# Patient Record
Sex: MALE | Race: WHITE | NOT HISPANIC OR LATINO | Employment: FULL TIME | URBAN - METROPOLITAN AREA
[De-identification: names, ages, dates, MRNs, and addresses within clinical notes are randomized per-mention and may not be internally consistent; named-entity substitution may affect disease eponyms.]

---

## 2017-02-22 ENCOUNTER — PATIENT MESSAGE (OUTPATIENT)
Dept: INTERNAL MEDICINE | Facility: CLINIC | Age: 42
End: 2017-02-22

## 2017-02-22 DIAGNOSIS — M54.2 NECK PAIN: Primary | ICD-10-CM

## 2017-02-24 ENCOUNTER — PATIENT MESSAGE (OUTPATIENT)
Dept: INTERNAL MEDICINE | Facility: CLINIC | Age: 42
End: 2017-02-24

## 2017-04-18 RX ORDER — TIOCONAZOLE 6.5 %
OINTMENT WITH PREFILLED APPLICATOR VAGINAL
Qty: 30 TABLET | Refills: 0 | Status: SHIPPED | OUTPATIENT
Start: 2017-04-18 | End: 2017-06-09 | Stop reason: SDUPTHER

## 2017-05-26 RX ORDER — OMEPRAZOLE 40 MG/1
CAPSULE, DELAYED RELEASE ORAL
Qty: 30 CAPSULE | Refills: 0 | Status: SHIPPED | OUTPATIENT
Start: 2017-05-26 | End: 2017-06-09 | Stop reason: SDUPTHER

## 2017-06-08 ENCOUNTER — PATIENT MESSAGE (OUTPATIENT)
Dept: INTERNAL MEDICINE | Facility: CLINIC | Age: 42
End: 2017-06-08

## 2017-06-11 RX ORDER — OMEPRAZOLE 40 MG/1
CAPSULE, DELAYED RELEASE ORAL
Qty: 30 CAPSULE | Refills: 0 | Status: SHIPPED | OUTPATIENT
Start: 2017-06-11 | End: 2017-07-29 | Stop reason: SDUPTHER

## 2017-07-31 RX ORDER — OMEPRAZOLE 40 MG/1
CAPSULE, DELAYED RELEASE ORAL
Qty: 30 CAPSULE | Refills: 4 | Status: SHIPPED | OUTPATIENT
Start: 2017-07-31 | End: 2018-08-13 | Stop reason: SDUPTHER

## 2017-10-27 ENCOUNTER — CLINICAL SUPPORT (OUTPATIENT)
Dept: FAMILY MEDICINE | Facility: CLINIC | Age: 42
End: 2017-10-27
Payer: COMMERCIAL

## 2017-10-27 DIAGNOSIS — Z23 NEED FOR PROPHYLACTIC VACCINATION AND INOCULATION AGAINST INFLUENZA: Primary | ICD-10-CM

## 2017-10-27 PROCEDURE — 90686 IIV4 VACC NO PRSV 0.5 ML IM: CPT | Mod: S$GLB,,, | Performed by: INTERNAL MEDICINE

## 2017-10-27 PROCEDURE — 99499 UNLISTED E&M SERVICE: CPT | Mod: S$GLB,,, | Performed by: INTERNAL MEDICINE

## 2017-10-27 PROCEDURE — 90471 IMMUNIZATION ADMIN: CPT | Mod: S$GLB,,, | Performed by: INTERNAL MEDICINE

## 2017-10-27 NOTE — PROGRESS NOTES
Patient tolerated injection well.  Instructed to wait 15 minutes after injection for monitoring. Verbalized understanding. VIS given.

## 2018-01-25 ENCOUNTER — HOSPITAL ENCOUNTER (INPATIENT)
Facility: HOSPITAL | Age: 43
LOS: 2 days | Discharge: HOME | DRG: 352 | End: 2018-01-28
Attending: EMERGENCY MEDICINE | Admitting: SURGERY
Payer: COMMERCIAL

## 2018-01-25 DIAGNOSIS — K40.30 INCARCERATED RIGHT INGUINAL HERNIA: Primary | ICD-10-CM

## 2018-01-25 PROCEDURE — 96374 THER/PROPH/DIAG INJ IV PUSH: CPT

## 2018-01-25 PROCEDURE — 96375 TX/PRO/DX INJ NEW DRUG ADDON: CPT

## 2018-01-25 PROCEDURE — 99285 EMERGENCY DEPT VISIT HI MDM: CPT | Mod: 25

## 2018-01-25 PROCEDURE — 96376 TX/PRO/DX INJ SAME DRUG ADON: CPT

## 2018-01-26 ENCOUNTER — SURGERY (OUTPATIENT)
Age: 43
End: 2018-01-26

## 2018-01-26 ENCOUNTER — ANESTHESIA EVENT (OUTPATIENT)
Dept: SURGERY | Facility: HOSPITAL | Age: 43
DRG: 352 | End: 2018-01-26
Payer: COMMERCIAL

## 2018-01-26 ENCOUNTER — ANESTHESIA (OUTPATIENT)
Dept: SURGERY | Facility: HOSPITAL | Age: 43
DRG: 352 | End: 2018-01-26
Payer: COMMERCIAL

## 2018-01-26 PROBLEM — K40.30 INCARCERATED RIGHT INGUINAL HERNIA: Status: ACTIVE | Noted: 2018-01-26

## 2018-01-26 LAB
ALBUMIN SERPL BCP-MCNC: 4.3 G/DL
ALP SERPL-CCNC: 78 U/L
ALT SERPL W/O P-5'-P-CCNC: 16 U/L
ANION GAP SERPL CALC-SCNC: 11 MMOL/L
AST SERPL-CCNC: 18 U/L
BASOPHILS # BLD AUTO: 0.01 K/UL
BASOPHILS NFR BLD: 0.1 %
BILIRUB SERPL-MCNC: 0.3 MG/DL
BUN SERPL-MCNC: 14 MG/DL
CALCIUM SERPL-MCNC: 10 MG/DL
CHLORIDE SERPL-SCNC: 102 MMOL/L
CO2 SERPL-SCNC: 29 MMOL/L
CREAT SERPL-MCNC: 0.8 MG/DL
DIFFERENTIAL METHOD: ABNORMAL
EOSINOPHIL # BLD AUTO: 0.4 K/UL
EOSINOPHIL NFR BLD: 2.9 %
ERYTHROCYTE [DISTWIDTH] IN BLOOD BY AUTOMATED COUNT: 14.5 %
EST. GFR  (AFRICAN AMERICAN): >60 ML/MIN/1.73 M^2
EST. GFR  (NON AFRICAN AMERICAN): >60 ML/MIN/1.73 M^2
GLUCOSE SERPL-MCNC: 108 MG/DL
HCT VFR BLD AUTO: 47.9 %
HGB BLD-MCNC: 16.2 G/DL
LYMPHOCYTES # BLD AUTO: 2.2 K/UL
LYMPHOCYTES NFR BLD: 16.2 %
MCH RBC QN AUTO: 33.5 PG
MCHC RBC AUTO-ENTMCNC: 33.8 G/DL
MCV RBC AUTO: 99 FL
MONOCYTES # BLD AUTO: 0.8 K/UL
MONOCYTES NFR BLD: 5.8 %
NEUTROPHILS # BLD AUTO: 10.2 K/UL
NEUTROPHILS NFR BLD: 75 %
PLATELET # BLD AUTO: 305 K/UL
PMV BLD AUTO: 10.9 FL
POCT GLUCOSE: 119 MG/DL (ref 70–110)
POCT GLUCOSE: 148 MG/DL (ref 70–110)
POTASSIUM SERPL-SCNC: 3.9 MMOL/L
PROT SERPL-MCNC: 7.1 G/DL
RBC # BLD AUTO: 4.84 M/UL
SODIUM SERPL-SCNC: 142 MMOL/L
WBC # BLD AUTO: 13.64 K/UL

## 2018-01-26 PROCEDURE — 37000008 HC ANESTHESIA 1ST 15 MINUTES: Performed by: SURGERY

## 2018-01-26 PROCEDURE — D9220A PRA ANESTHESIA: Mod: ANES,,, | Performed by: ANESTHESIOLOGY

## 2018-01-26 PROCEDURE — 63600175 PHARM REV CODE 636 W HCPCS: Performed by: NURSE ANESTHETIST, CERTIFIED REGISTERED

## 2018-01-26 PROCEDURE — 25000003 PHARM REV CODE 250: Performed by: STUDENT IN AN ORGANIZED HEALTH CARE EDUCATION/TRAINING PROGRAM

## 2018-01-26 PROCEDURE — 25000003 PHARM REV CODE 250: Performed by: SURGERY

## 2018-01-26 PROCEDURE — 63600175 PHARM REV CODE 636 W HCPCS: Performed by: EMERGENCY MEDICINE

## 2018-01-26 PROCEDURE — C9113 INJ PANTOPRAZOLE SODIUM, VIA: HCPCS | Performed by: SURGERY

## 2018-01-26 PROCEDURE — 36000706: Performed by: SURGERY

## 2018-01-26 PROCEDURE — 80053 COMPREHEN METABOLIC PANEL: CPT

## 2018-01-26 PROCEDURE — 25000003 PHARM REV CODE 250: Performed by: NURSE ANESTHETIST, CERTIFIED REGISTERED

## 2018-01-26 PROCEDURE — 36000707: Performed by: SURGERY

## 2018-01-26 PROCEDURE — 85025 COMPLETE CBC W/AUTO DIFF WBC: CPT

## 2018-01-26 PROCEDURE — 25000242 PHARM REV CODE 250 ALT 637 W/ HCPCS: Performed by: NURSE ANESTHETIST, CERTIFIED REGISTERED

## 2018-01-26 PROCEDURE — 63600175 PHARM REV CODE 636 W HCPCS: Performed by: ANESTHESIOLOGY

## 2018-01-26 PROCEDURE — 12000002 HC ACUTE/MED SURGE SEMI-PRIVATE ROOM

## 2018-01-26 PROCEDURE — 71000033 HC RECOVERY, INTIAL HOUR: Performed by: SURGERY

## 2018-01-26 PROCEDURE — 88302 TISSUE EXAM BY PATHOLOGIST: CPT | Performed by: PATHOLOGY

## 2018-01-26 PROCEDURE — 63600175 PHARM REV CODE 636 W HCPCS: Performed by: SURGERY

## 2018-01-26 PROCEDURE — D9220A PRA ANESTHESIA: Mod: CRNA,,, | Performed by: NURSE ANESTHETIST, CERTIFIED REGISTERED

## 2018-01-26 PROCEDURE — S0020 INJECTION, BUPIVICAINE HYDRO: HCPCS | Performed by: SURGERY

## 2018-01-26 PROCEDURE — 27201423 OPTIME MED/SURG SUP & DEVICES STERILE SUPPLY: Performed by: SURGERY

## 2018-01-26 PROCEDURE — 88302 TISSUE EXAM BY PATHOLOGIST: CPT | Mod: 26,,, | Performed by: PATHOLOGY

## 2018-01-26 PROCEDURE — C1781 MESH (IMPLANTABLE): HCPCS | Performed by: SURGERY

## 2018-01-26 PROCEDURE — 37000009 HC ANESTHESIA EA ADD 15 MINS: Performed by: SURGERY

## 2018-01-26 PROCEDURE — 25500020 PHARM REV CODE 255: Performed by: EMERGENCY MEDICINE

## 2018-01-26 PROCEDURE — 0YU50JZ SUPPLEMENT RIGHT INGUINAL REGION WITH SYNTHETIC SUBSTITUTE, OPEN APPROACH: ICD-10-PCS | Performed by: SURGERY

## 2018-01-26 DEVICE — MESH PROLENE 3INX6IN 6/BX: Type: IMPLANTABLE DEVICE | Site: GROIN | Status: FUNCTIONAL

## 2018-01-26 RX ORDER — SODIUM CHLORIDE 9 MG/ML
INJECTION, SOLUTION INTRAVENOUS CONTINUOUS
Status: DISCONTINUED | OUTPATIENT
Start: 2018-01-26 | End: 2018-01-27

## 2018-01-26 RX ORDER — MIDAZOLAM HYDROCHLORIDE 1 MG/ML
INJECTION, SOLUTION INTRAMUSCULAR; INTRAVENOUS
Status: DISCONTINUED | OUTPATIENT
Start: 2018-01-26 | End: 2018-01-26

## 2018-01-26 RX ORDER — ACETAMINOPHEN 325 MG/1
650 TABLET ORAL EVERY 6 HOURS PRN
Status: DISCONTINUED | OUTPATIENT
Start: 2018-01-26 | End: 2018-01-26

## 2018-01-26 RX ORDER — METOCLOPRAMIDE HYDROCHLORIDE 5 MG/ML
10 INJECTION INTRAMUSCULAR; INTRAVENOUS EVERY 10 MIN PRN
Status: DISCONTINUED | OUTPATIENT
Start: 2018-01-26 | End: 2018-01-26 | Stop reason: HOSPADM

## 2018-01-26 RX ORDER — ONDANSETRON 2 MG/ML
4 INJECTION INTRAMUSCULAR; INTRAVENOUS EVERY 6 HOURS PRN
Status: DISCONTINUED | OUTPATIENT
Start: 2018-01-26 | End: 2018-01-28 | Stop reason: HOSPADM

## 2018-01-26 RX ORDER — SUCCINYLCHOLINE CHLORIDE 20 MG/ML
INJECTION INTRAMUSCULAR; INTRAVENOUS
Status: DISCONTINUED | OUTPATIENT
Start: 2018-01-26 | End: 2018-01-26

## 2018-01-26 RX ORDER — HYDROMORPHONE HYDROCHLORIDE 2 MG/ML
0.5 INJECTION, SOLUTION INTRAMUSCULAR; INTRAVENOUS; SUBCUTANEOUS
Status: COMPLETED | OUTPATIENT
Start: 2018-01-26 | End: 2018-01-26

## 2018-01-26 RX ORDER — HYDROMORPHONE HYDROCHLORIDE 2 MG/ML
0.2 INJECTION, SOLUTION INTRAMUSCULAR; INTRAVENOUS; SUBCUTANEOUS EVERY 5 MIN PRN
Status: DISCONTINUED | OUTPATIENT
Start: 2018-01-26 | End: 2018-01-26 | Stop reason: HOSPADM

## 2018-01-26 RX ORDER — HYDROMORPHONE HYDROCHLORIDE 2 MG/ML
0.5 INJECTION, SOLUTION INTRAMUSCULAR; INTRAVENOUS; SUBCUTANEOUS EVERY 4 HOURS PRN
Status: DISCONTINUED | OUTPATIENT
Start: 2018-01-26 | End: 2018-01-26

## 2018-01-26 RX ORDER — PANTOPRAZOLE SODIUM 40 MG/10ML
40 INJECTION, POWDER, LYOPHILIZED, FOR SOLUTION INTRAVENOUS
Status: DISCONTINUED | OUTPATIENT
Start: 2018-01-26 | End: 2018-01-26

## 2018-01-26 RX ORDER — MEPERIDINE HYDROCHLORIDE 50 MG/ML
12.5 INJECTION INTRAMUSCULAR; INTRAVENOUS; SUBCUTANEOUS ONCE AS NEEDED
Status: DISCONTINUED | OUTPATIENT
Start: 2018-01-26 | End: 2018-01-26 | Stop reason: HOSPADM

## 2018-01-26 RX ORDER — DIPHENHYDRAMINE HYDROCHLORIDE 50 MG/ML
25 INJECTION INTRAMUSCULAR; INTRAVENOUS EVERY 6 HOURS PRN
Status: DISCONTINUED | OUTPATIENT
Start: 2018-01-26 | End: 2018-01-26 | Stop reason: HOSPADM

## 2018-01-26 RX ORDER — ALBUTEROL SULFATE 90 UG/1
AEROSOL, METERED RESPIRATORY (INHALATION)
Status: DISCONTINUED | OUTPATIENT
Start: 2018-01-26 | End: 2018-01-26

## 2018-01-26 RX ORDER — ENOXAPARIN SODIUM 100 MG/ML
40 INJECTION SUBCUTANEOUS EVERY 24 HOURS
Status: DISCONTINUED | OUTPATIENT
Start: 2018-01-26 | End: 2018-01-28 | Stop reason: HOSPADM

## 2018-01-26 RX ORDER — SODIUM CHLORIDE 9 MG/ML
INJECTION, SOLUTION INTRAVENOUS CONTINUOUS
Status: DISCONTINUED | OUTPATIENT
Start: 2018-01-26 | End: 2018-01-26

## 2018-01-26 RX ORDER — FENTANYL CITRATE 50 UG/ML
INJECTION, SOLUTION INTRAMUSCULAR; INTRAVENOUS
Status: DISCONTINUED | OUTPATIENT
Start: 2018-01-26 | End: 2018-01-26

## 2018-01-26 RX ORDER — SODIUM CHLORIDE, SODIUM LACTATE, POTASSIUM CHLORIDE, CALCIUM CHLORIDE 600; 310; 30; 20 MG/100ML; MG/100ML; MG/100ML; MG/100ML
INJECTION, SOLUTION INTRAVENOUS CONTINUOUS PRN
Status: DISCONTINUED | OUTPATIENT
Start: 2018-01-26 | End: 2018-01-26

## 2018-01-26 RX ORDER — ACETAMINOPHEN 10 MG/ML
1000 INJECTION, SOLUTION INTRAVENOUS ONCE
Status: COMPLETED | OUTPATIENT
Start: 2018-01-26 | End: 2018-01-26

## 2018-01-26 RX ORDER — ROCURONIUM BROMIDE 10 MG/ML
INJECTION, SOLUTION INTRAVENOUS
Status: DISCONTINUED | OUTPATIENT
Start: 2018-01-26 | End: 2018-01-26

## 2018-01-26 RX ORDER — HYDROCODONE BITARTRATE AND ACETAMINOPHEN 5; 325 MG/1; MG/1
1 TABLET ORAL EVERY 4 HOURS PRN
Status: DISCONTINUED | OUTPATIENT
Start: 2018-01-26 | End: 2018-01-26

## 2018-01-26 RX ORDER — ONDANSETRON 2 MG/ML
4 INJECTION INTRAMUSCULAR; INTRAVENOUS
Status: COMPLETED | OUTPATIENT
Start: 2018-01-26 | End: 2018-01-26

## 2018-01-26 RX ORDER — HYDROCODONE BITARTRATE AND ACETAMINOPHEN 5; 325 MG/1; MG/1
2 TABLET ORAL EVERY 4 HOURS PRN
Status: DISCONTINUED | OUTPATIENT
Start: 2018-01-26 | End: 2018-01-28 | Stop reason: HOSPADM

## 2018-01-26 RX ORDER — GLYCOPYRROLATE 0.2 MG/ML
INJECTION INTRAMUSCULAR; INTRAVENOUS
Status: DISCONTINUED | OUTPATIENT
Start: 2018-01-26 | End: 2018-01-26

## 2018-01-26 RX ORDER — CEFAZOLIN SODIUM 1 G/3ML
INJECTION, POWDER, FOR SOLUTION INTRAMUSCULAR; INTRAVENOUS
Status: DISCONTINUED | OUTPATIENT
Start: 2018-01-26 | End: 2018-01-26

## 2018-01-26 RX ORDER — PANTOPRAZOLE SODIUM 40 MG/1
40 TABLET, DELAYED RELEASE ORAL DAILY
Status: DISCONTINUED | OUTPATIENT
Start: 2018-01-27 | End: 2018-01-28 | Stop reason: HOSPADM

## 2018-01-26 RX ORDER — LIDOCAINE HCL/PF 100 MG/5ML
SYRINGE (ML) INTRAVENOUS
Status: DISCONTINUED | OUTPATIENT
Start: 2018-01-26 | End: 2018-01-26

## 2018-01-26 RX ORDER — SODIUM CHLORIDE 0.9 % (FLUSH) 0.9 %
3 SYRINGE (ML) INJECTION
Status: DISCONTINUED | OUTPATIENT
Start: 2018-01-26 | End: 2018-01-26 | Stop reason: HOSPADM

## 2018-01-26 RX ORDER — DOCUSATE SODIUM 100 MG/1
100 CAPSULE, LIQUID FILLED ORAL 2 TIMES DAILY
Status: DISCONTINUED | OUTPATIENT
Start: 2018-01-26 | End: 2018-01-28 | Stop reason: HOSPADM

## 2018-01-26 RX ORDER — PROCHLORPERAZINE EDISYLATE 5 MG/ML
5 INJECTION INTRAMUSCULAR; INTRAVENOUS EVERY 6 HOURS PRN
Status: DISCONTINUED | OUTPATIENT
Start: 2018-01-26 | End: 2018-01-26

## 2018-01-26 RX ORDER — MORPHINE SULFATE 10 MG/ML
2 INJECTION, SOLUTION INTRAMUSCULAR; INTRAVENOUS EVERY 4 HOURS PRN
Status: DISCONTINUED | OUTPATIENT
Start: 2018-01-26 | End: 2018-01-26

## 2018-01-26 RX ORDER — ONDANSETRON 2 MG/ML
INJECTION INTRAMUSCULAR; INTRAVENOUS
Status: DISCONTINUED | OUTPATIENT
Start: 2018-01-26 | End: 2018-01-26

## 2018-01-26 RX ORDER — PANTOPRAZOLE SODIUM 40 MG/10ML
40 INJECTION, POWDER, LYOPHILIZED, FOR SOLUTION INTRAVENOUS DAILY
Status: DISCONTINUED | OUTPATIENT
Start: 2018-01-26 | End: 2018-01-26

## 2018-01-26 RX ORDER — NEOSTIGMINE METHYLSULFATE 1 MG/ML
INJECTION, SOLUTION INTRAVENOUS
Status: DISCONTINUED | OUTPATIENT
Start: 2018-01-26 | End: 2018-01-26

## 2018-01-26 RX ORDER — ONDANSETRON 2 MG/ML
4 INJECTION INTRAMUSCULAR; INTRAVENOUS EVERY 8 HOURS PRN
Status: DISCONTINUED | OUTPATIENT
Start: 2018-01-26 | End: 2018-01-26

## 2018-01-26 RX ORDER — HYDROMORPHONE HYDROCHLORIDE 2 MG/ML
1 INJECTION, SOLUTION INTRAMUSCULAR; INTRAVENOUS; SUBCUTANEOUS EVERY 4 HOURS PRN
Status: DISCONTINUED | OUTPATIENT
Start: 2018-01-26 | End: 2018-01-28 | Stop reason: HOSPADM

## 2018-01-26 RX ORDER — HYDROCODONE BITARTRATE AND ACETAMINOPHEN 5; 325 MG/1; MG/1
1 TABLET ORAL EVERY 6 HOURS PRN
Status: DISCONTINUED | OUTPATIENT
Start: 2018-01-26 | End: 2018-01-28 | Stop reason: HOSPADM

## 2018-01-26 RX ORDER — BUPIVACAINE HYDROCHLORIDE 5 MG/ML
INJECTION, SOLUTION PERINEURAL
Status: DISCONTINUED | OUTPATIENT
Start: 2018-01-26 | End: 2018-01-26 | Stop reason: HOSPADM

## 2018-01-26 RX ORDER — PROPOFOL 10 MG/ML
VIAL (ML) INTRAVENOUS
Status: DISCONTINUED | OUTPATIENT
Start: 2018-01-26 | End: 2018-01-26

## 2018-01-26 RX ADMIN — HYDROMORPHONE HYDROCHLORIDE 0.2 MG: 2 INJECTION, SOLUTION INTRAMUSCULAR; INTRAVENOUS; SUBCUTANEOUS at 06:01

## 2018-01-26 RX ADMIN — ALBUTEROL SULFATE 2 PUFF: 90 AEROSOL, METERED RESPIRATORY (INHALATION) at 05:01

## 2018-01-26 RX ADMIN — PROPOFOL 150 MG: 10 INJECTION, EMULSION INTRAVENOUS at 04:01

## 2018-01-26 RX ADMIN — ROCURONIUM BROMIDE 5 MG: 10 INJECTION, SOLUTION INTRAVENOUS at 04:01

## 2018-01-26 RX ADMIN — DOCUSATE SODIUM 100 MG: 100 CAPSULE, LIQUID FILLED ORAL at 09:01

## 2018-01-26 RX ADMIN — MIDAZOLAM HYDROCHLORIDE 2 MG: 1 INJECTION, SOLUTION INTRAMUSCULAR; INTRAVENOUS at 04:01

## 2018-01-26 RX ADMIN — PANTOPRAZOLE SODIUM 40 MG: 40 INJECTION, POWDER, FOR SOLUTION INTRAVENOUS at 07:01

## 2018-01-26 RX ADMIN — SODIUM CHLORIDE 500 ML: 0.9 INJECTION, SOLUTION INTRAVENOUS at 12:01

## 2018-01-26 RX ADMIN — HYDROMORPHONE HYDROCHLORIDE 0.5 MG: 2 INJECTION, SOLUTION INTRAMUSCULAR; INTRAVENOUS; SUBCUTANEOUS at 02:01

## 2018-01-26 RX ADMIN — FENTANYL CITRATE 100 MCG: 50 INJECTION INTRAMUSCULAR; INTRAVENOUS at 04:01

## 2018-01-26 RX ADMIN — PROPOFOL 50 MG: 10 INJECTION, EMULSION INTRAVENOUS at 04:01

## 2018-01-26 RX ADMIN — GLYCOPYRROLATE 0.2 MG: 0.2 INJECTION, SOLUTION INTRAMUSCULAR; INTRAVENOUS at 04:01

## 2018-01-26 RX ADMIN — ROCURONIUM BROMIDE 25 MG: 10 INJECTION, SOLUTION INTRAVENOUS at 04:01

## 2018-01-26 RX ADMIN — ONDANSETRON 4 MG: 2 INJECTION, SOLUTION INTRAMUSCULAR; INTRAVENOUS at 04:01

## 2018-01-26 RX ADMIN — SODIUM CHLORIDE, SODIUM LACTATE, POTASSIUM CHLORIDE, AND CALCIUM CHLORIDE: .6; .31; .03; .02 INJECTION, SOLUTION INTRAVENOUS at 05:01

## 2018-01-26 RX ADMIN — GLYCOPYRROLATE 0.2 MG: 0.2 INJECTION, SOLUTION INTRAMUSCULAR; INTRAVENOUS at 06:01

## 2018-01-26 RX ADMIN — IOHEXOL 100 ML: 350 INJECTION, SOLUTION INTRAVENOUS at 02:01

## 2018-01-26 RX ADMIN — GLYCOPYRROLATE 0.2 MG: 0.2 INJECTION, SOLUTION INTRAMUSCULAR; INTRAVENOUS at 05:01

## 2018-01-26 RX ADMIN — SUCCINYLCHOLINE CHLORIDE 100 MG: 20 INJECTION, SOLUTION INTRAMUSCULAR; INTRAVENOUS at 04:01

## 2018-01-26 RX ADMIN — ONDANSETRON 4 MG: 2 INJECTION INTRAMUSCULAR; INTRAVENOUS at 06:01

## 2018-01-26 RX ADMIN — HYDROMORPHONE HYDROCHLORIDE 1 MG: 2 INJECTION, SOLUTION INTRAMUSCULAR; INTRAVENOUS; SUBCUTANEOUS at 07:01

## 2018-01-26 RX ADMIN — CEFAZOLIN SODIUM 1 G: 1 POWDER, FOR SOLUTION INTRAMUSCULAR; INTRAVENOUS at 05:01

## 2018-01-26 RX ADMIN — ENOXAPARIN SODIUM 40 MG: 100 INJECTION SUBCUTANEOUS at 05:01

## 2018-01-26 RX ADMIN — SODIUM CHLORIDE: 0.9 INJECTION, SOLUTION INTRAVENOUS at 07:01

## 2018-01-26 RX ADMIN — ROCURONIUM BROMIDE 10 MG: 10 INJECTION, SOLUTION INTRAVENOUS at 05:01

## 2018-01-26 RX ADMIN — HYDROMORPHONE HYDROCHLORIDE 1 MG: 2 INJECTION, SOLUTION INTRAMUSCULAR; INTRAVENOUS; SUBCUTANEOUS at 02:01

## 2018-01-26 RX ADMIN — ONDANSETRON 4 MG: 2 INJECTION INTRAMUSCULAR; INTRAVENOUS at 11:01

## 2018-01-26 RX ADMIN — ACETAMINOPHEN 1000 MG: 10 INJECTION, SOLUTION INTRAVENOUS at 06:01

## 2018-01-26 RX ADMIN — NEOSTIGMINE METHYLSULFATE 2.5 MG: 1 INJECTION INTRAVENOUS at 05:01

## 2018-01-26 RX ADMIN — HYDROCODONE BITARTRATE AND ACETAMINOPHEN 2 TABLET: 5; 325 TABLET ORAL at 07:01

## 2018-01-26 RX ADMIN — HYDROMORPHONE HYDROCHLORIDE 0.5 MG: 2 INJECTION, SOLUTION INTRAMUSCULAR; INTRAVENOUS; SUBCUTANEOUS at 12:01

## 2018-01-26 RX ADMIN — LIDOCAINE HYDROCHLORIDE 100 MG: 20 INJECTION, SOLUTION INTRAVENOUS at 04:01

## 2018-01-26 RX ADMIN — NEOSTIGMINE METHYLSULFATE 2.5 MG: 1 INJECTION INTRAVENOUS at 06:01

## 2018-01-26 RX ADMIN — CEFAZOLIN SODIUM 1 G: 1 POWDER, FOR SOLUTION INTRAMUSCULAR; INTRAVENOUS at 04:01

## 2018-01-26 RX ADMIN — SODIUM CHLORIDE, SODIUM LACTATE, POTASSIUM CHLORIDE, AND CALCIUM CHLORIDE: .6; .31; .03; .02 INJECTION, SOLUTION INTRAVENOUS at 04:01

## 2018-01-26 RX ADMIN — SODIUM CHLORIDE: 0.9 INJECTION, SOLUTION INTRAVENOUS at 09:01

## 2018-01-26 RX ADMIN — PROPOFOL 50 MG: 10 INJECTION, EMULSION INTRAVENOUS at 05:01

## 2018-01-26 RX ADMIN — ONDANSETRON 4 MG: 2 INJECTION INTRAMUSCULAR; INTRAVENOUS at 12:01

## 2018-01-26 NOTE — ANESTHESIA POSTPROCEDURE EVALUATION
"Anesthesia Post Evaluation    Patient: Ever Rubin III    Procedure(s) Performed: Procedure(s) (LRB):  REPAIR-HERNIA-INGUINAL-INITIAL (5 YRS+) (Right)    Final Anesthesia Type: general  Patient location during evaluation: PACU  Patient participation: Yes- Able to Participate  Level of consciousness: awake and alert, oriented and awake  Post-procedure vital signs: reviewed and stable  Pain management: adequate  Airway patency: patent  PONV status at discharge: No PONV  Anesthetic complications: no      Cardiovascular status: blood pressure returned to baseline  Respiratory status: unassisted and spontaneous ventilation  Hydration status: euvolemic  Follow-up not needed.        Visit Vitals  /81 (BP Location: Left arm, Patient Position: Lying)   Pulse 60   Temp 36.5 °C (97.7 °F) (Oral)   Resp 18   Ht 5' 10" (1.778 m)   Wt 89.4 kg (197 lb)   SpO2 95%   BMI 28.27 kg/m²       Pain/Ze Score: Pain Assessment Performed: Yes (1/26/2018  6:12 AM)  Presence of Pain: non-verbal indicators absent (1/26/2018  6:12 AM)  Pain Rating Prior to Med Admin: 5 (1/26/2018  6:40 AM)  Pain Rating Post Med Admin: 4 (1/26/2018  6:33 AM)  Ze Score: 10 (1/26/2018  6:33 AM)      "

## 2018-01-26 NOTE — NURSING
Patient arrived to unit from surgery via stretcher, accompanied by staff and family.  Patient oriented to room and call light system. Bed in low position, wheels locked, alarms on and audible.  Potty, position, and pain needs addressed.  No acute distress noted;  Will continue to monitor.

## 2018-01-26 NOTE — PROGRESS NOTES
WRITTEN HEALTHCARE DISCHARGE INFORMATION     Things that YOU are responsible for to Manage Your Care At Home:  1. Getting your prescriptions filled.  2. Taking you medications as directed. DO NOT MISS ANY DOSES!  3. Going to your follow-up doctor appointments. This is important because it allows the doctor to monitor your progress and to determine if any changes need to be made to your treatment plan.    If you are unable to make your follow up appointments, please call the number listed and reschedule this appointment.     After discharge, if you need assistance, you can call [facility] at the number below.     If you are experience any signs or symptoms, Call your doctor or Call 911 and come to your nearest Emergency Room.    Thank you for choosing [facility] and allowing us to care for you.   From your care management team: Lillian JENKINS,RSW [phone]     You should receive a call from [facility]charge [facility] within 48-72 hours to help manage your care after discharge. Please try to make sure that you answer your phone for this important phone call.     Follow-up Information     Brent Boudreaux MD On 2/6/2018.    Specialty:  General Surgery  Why:  Tuesday at 2:15pm.   Contact information:  [address] [place] [address] [id number] [phone]

## 2018-01-26 NOTE — TRANSFER OF CARE
"Anesthesia Transfer of Care Note    Patient: Ever Rubin III    Procedure(s) Performed: Procedure(s) (LRB):  REPAIR-HERNIA-INGUINAL-INITIAL (5 YRS+) (Right)    Patient location: PACU    Anesthesia Type: general    Transport from OR: Transported from OR on room air with adequate spontaneous ventilation    Post pain: adequate analgesia    Post assessment: no apparent anesthetic complications    Post vital signs: stable    Level of consciousness: awake, alert and oriented    Nausea/Vomiting: no nausea/vomiting    Complications: none    Transfer of care protocol was followed      Last vitals:   Visit Vitals  BP (!) 140/78   Pulse 74   Temp 36.4 °C (97.5 °F) (Oral)   Resp 14   Ht 5' 10" (1.778 m)   Wt 89.4 kg (197 lb)   SpO2 100%   BMI 28.27 kg/m²     "

## 2018-01-26 NOTE — PLAN OF CARE
SW met with pt at bedside. SW explained role in . SW inquired about HELP AT HOME. Pt stated that pt will have help at home with Spouse. SW reviewed HELP AT HOME and DISCHARGE PLANNING brochure of questions to think about while in the hospital. Pt voiced understanding. SW inquired about what pt feels he is RESPONSIBLE for to MANAGE HEALTH AT HOME. Pt stated going to MD appointments and medications. SW voiced that taking medications at the appropriate time is important with managing care. SW informed pt that a DISCHARGE EDUCATION SHEET will be provided during stay. Pt voiced understanding.     [employer] [id number] [facility], [address] AT [facility] & [address]  [facility] [address]  Phone: [phone] Fax: [phone]       01/26/18 1156   Discharge Assessment   Assessment Type Discharge Planning Assessment   Confirmed/corrected address and phone number on facesheet? Yes   Assessment information obtained from? Patient   Prior to hospitilization cognitive status: Alert/Oriented   Prior to hospitalization functional status: Independent   Current cognitive status: Alert/Oriented   Current Functional Status: Independent   Lives With spouse   Able to Return to Prior Arrangements yes   Is patient able to care for self after discharge? Yes   Patient's perception of discharge disposition home or selfcare   Readmission Within The Last 30 Days no previous admission in last 30 days   Equipment Currently Used at Home none   Do you have any problems affording any of your prescribed medications? No   Is the patient taking medications as prescribed? yes   Does the patient have transportation home? Yes   Transportation Available car;family or friend will provide   Discharge Plan A Home   Discharge Plan B Home   Patient/Family In Agreement With Plan yes

## 2018-01-26 NOTE — PROGRESS NOTES
[facility]  General Surgery  Progress Note     Subjective:      Interval History: Tolerated surgery well. Complaining of right shoulder pain. Urinating without issue. Tolerating clears.     Post-Op Info:  Open right inguinal hernia repair, POD #0      Objective:   Vital Signs:   Vitals:    01/26/18 1607 01/26/18 1730 01/26/18 1810 01/26/18 1932   BP: 111/74 (!) 90/57 111/73 112/68   BP Location: Left arm Left arm Right arm Right arm   Patient Position: Lying Sitting Lying Lying   Pulse: 78  82 91   Resp: 18 19 18 18   Temp: 97.6 °F (36.4 °C)  97.8 °F (36.6 °C) 98.2 °F (36.8 °C)   TempSrc: Oral  Oral Oral   SpO2: 98% 100% 100% 98%   Weight:       Height:          Intake/Output Summary (Last 24 hours):    Intake/Output Summary (Last 24 hours) at 01/26/18 2123  Last data filed at 01/26/18 1725   Gross per 24 hour   Intake             2080 ml   Output                0 ml   Net             2080 ml      Physical Exam:  General appearance: awake, alert, NAD  Abdomen: soft, NTND  : Right inguinal dressing intact     Significant Labs:  WBC 13.6, Hgb 16.2, Hct 47.9, plt 305  Na 142, K 3.9, BUN 14, Cr 0.8     Assessment/Plan:   41 yo man with history of laparoscopic sleeve gastrectomy, GERD presenting with an incarcerated right inguinal hernia, s/p open right inguinal hernia repair with mesh, POD #1    -Continue clear liquid diet until having bowel function  -Norco PRN pain  -IV fluids until tolerating diet  -PPI  -OOB tid, ambulate in hallway  -Bowel regiment  -Lovenox/SCDs  -Likely d/c home tomorrow     Renee Palacois MD  General Surgery  [facility]

## 2018-01-26 NOTE — ANESTHESIA PREPROCEDURE EVALUATION
01/26/2018  Ever Rubin III is a 42 y.o., male.    Anesthesia Evaluation    I have reviewed the Patient Summary Reports.     I have reviewed the Medications.     Review of Systems  Anesthesia Hx:  No problems with previous Anesthesia   Denies Personal Hx of Anesthesia complications.   Hematology/Oncology:  Hematology Normal   Oncology Normal     EENT/Dental:EENT/Dental Normal   Cardiovascular:  Cardiovascular Normal Exercise tolerance: good     Pulmonary:  Pulmonary Normal    Renal/:  Renal/ Normal     Hepatic/GI:   GERD    Musculoskeletal:  Musculoskeletal Normal    Neurological:  Neurology Normal    Endocrine:  Endocrine Normal    Dermatological:  Skin Normal    Psych:   Psychiatric History          Physical Exam  General:  Well nourished    Airway/Jaw/Neck:  Airway Findings: Mouth Opening: Normal Tongue: Normal  Mallampati: II      Dental:  Dental Findings: In tact   Chest/Lungs:  Chest/Lungs Clear    Heart/Vascular:  Heart Findings: Normal Heart murmur: negative       Mental Status:  Mental Status Findings:  Cooperative, Alert and Oriented         Anesthesia Plan  Type of Anesthesia, risks & benefits discussed:  Anesthesia Type:  general, MAC, regional  Patient's Preference:   Intra-op Monitoring Plan: standard ASA monitors  Intra-op Monitoring Plan Comments:   Post Op Pain Control Plan: multimodal analgesia  Post Op Pain Control Plan Comments:   Induction:   IV  Beta Blocker:  Patient is not currently on a Beta-Blocker (No further documentation required).       Informed Consent: Patient understands risks and agrees with Anesthesia plan.  Questions answered. Anesthesia consent signed with patient.  ASA Score: 2  emergent   Day of Surgery Review of History & Physical:            Ready For Surgery From Anesthesia Perspective.

## 2018-01-26 NOTE — ED PROVIDER NOTES
Encounter Date: 1/25/2018    SCRIBE #1 NOTE: I, Gavin Cardoso, am scribing for, and in the presence of,  Henry Elliott III, MD. I have scribed the following portions of the note - Other sections scribed: HPI, ROS.       History     Chief Complaint   Patient presents with    Inguinal Hernia     Pt reports bariatric surgery in June now c/o right lower inguinal pain since 2030 today (this is the third time this month, today it is not going away).  Reports taking Flexiril 10mg today about 2100 today.      CC: Inguinal Hernia    HPI: This 42 y.o. Male with PMHx meningitis, GERD, bronchitis, and PSHx sleeve gastroplasty presents to the ED via EMS c/o painful R inguinal mass which began at 2030 tonight. Pt says he felt a similar pain and mass a few weeks ago, and then last week, which resolved after laying down. Pt says the mass has grown in size. Pt reports exacerbation when sitting. Pt reports associated nausea. Pt denies vomiting, dysuria, and hematuria. No prior tx reported. Pt reports having bariatric surgery this past June.       The history is provided by the patient. No  was used.     Review of patient's allergies indicates:   Allergen Reactions    Penicillins Hives     Past Medical History:   Diagnosis Date    ADHD (attention deficit hyperactivity disorder)     Allergy     Bronchitis     Cataract     Depression     GERD (gastroesophageal reflux disease)     Meningitis     Snores      Past Surgical History:   Procedure Laterality Date    CATARACT EXTRACTION W/ INTRAOCULAR LENS  IMPLANT, BILATERAL      EYE SURGERY      SHOULDER SURGERY      SLEEVE GASTROPLASTY       History reviewed. No pertinent family history.  Social History   Substance Use Topics    Smoking status: Current Every Day Smoker     Packs/day: 0.50    Smokeless tobacco: Never Used    Alcohol use No     Review of Systems   Constitutional: Negative for fever.   HENT: Negative for sore throat.    Respiratory: Negative  for shortness of breath.    Cardiovascular: Negative for chest pain.   Gastrointestinal: Positive for nausea. Negative for diarrhea and vomiting.   Genitourinary: Negative for dysuria and hematuria.   Musculoskeletal: Negative for back pain.        (+) R inguinal mass  (+) R inguinal pain   Skin: Negative for rash.   Neurological: Negative for weakness.   Hematological: Does not bruise/bleed easily.       Physical Exam     Initial Vitals [01/25/18 2328]   BP Pulse Resp Temp SpO2   138/88 77 16 97.9 °F (36.6 °C) 99 %      MAP       104.67         Physical Exam    Constitutional: He appears well-developed and well-nourished. He is not diaphoretic. No distress.   HENT:   Head: Normocephalic and atraumatic.   Nose: Nose normal.   Mouth/Throat: Oropharynx is clear and moist. No oropharyngeal exudate.   Eyes: Conjunctivae and EOM are normal. Pupils are equal, round, and reactive to light. No scleral icterus.   Neck: Normal range of motion. Neck supple. No thyromegaly present. No tracheal deviation present.   Cardiovascular: Normal rate, regular rhythm and normal heart sounds. Exam reveals no gallop and no friction rub.    No murmur heard.  Pulmonary/Chest: Breath sounds normal. No respiratory distress. He has no wheezes. He has no rhonchi. He has no rales.   Abdominal: Soft. Bowel sounds are normal. He exhibits no distension and no mass. There is no tenderness. There is no rebound and no guarding.       Musculoskeletal: Normal range of motion. He exhibits no edema or tenderness.   Lymphadenopathy:     He has no cervical adenopathy.   Neurological: He is alert and oriented to person, place, and time. He has normal strength. No cranial nerve deficit or sensory deficit.   Skin: Skin is warm and dry. No rash noted. No erythema. No pallor.   Psychiatric: He has a normal mood and affect. His behavior is normal. Thought content normal.         ED Course   Procedures  Labs Reviewed   CBC W/ AUTO DIFFERENTIAL   COMPREHENSIVE  METABOLIC PANEL             Medical Decision Making:   Initial Assessment:   42-year-old male presents complaining of right inguinal pain and mass that began approximately 3.5 hours prior to my evaluation.  He reports similar symptoms on and off for the last few weeks that have resolved spontaneously, with this episode unresolving and much more painful than previous.  Does report nausea but denies vomiting and reports he has had several bowel movements.  Examination reveals probable right-sided inguinal hernia that is firm and tender to palpation.  Will administer pain medication and attempt reduction.  If this is unsuccessful will obtain CT abdomen.    Reduction unsuccessful after multiple doses of Dilaudid and optimized patient positioning.   Differential Diagnosis:   We will screen for signs of incarceration or strangulation with CT  ED Management:  CT shows signs suggestive of incarceration, with dilation of small bowel loop within the hernia sac and also fat stranding.  There is no perforation or pneumatosis.    Hernia remains reducible.  I have spoken with Dr. Boudreaux and will place admission orders to the surgery service for possible repeated reduction attempt versus operation.            Scribe Attestation:   Scribe #1: I performed the above scribed service and the documentation accurately describes the services I performed. I attest to the accuracy of the note.    Attending Attestation:           Physician Attestation for Scribe:  Physician Attestation Statement for Scribe #1: I, Henry Elliott III, MD, reviewed documentation, as scribed by Gavin Cardoso in my presence, and it is both accurate and complete.                 ED Course      Clinical Impression:   The encounter diagnosis was Incarcerated right inguinal hernia.                           Henry Elliott III, MD  01/26/18 0411

## 2018-01-26 NOTE — ED TRIAGE NOTES
Pt c/o RQP x 2 hours. Pt states pain feels like someone kicked him. Pt states nausea and pain feels like cramping gas pain. Pt is post gastric sleeve surgery 6/19/17.

## 2018-01-26 NOTE — OP NOTE
DATE OF PROCEDURE:  01/26/2018    PREOPERATIVE DIAGNOSIS:  Incarcerated right inguinal hernia.    POSTOPERATIVE DIAGNOSIS:  Incarcerated right inguinal hernia.    OPERATIVE PROCEDURE:  Right inguinal hernia repair with Prolene mesh.    SURGEON:  Brent Boudreaux M.D.    PROCEDURE IN DETAIL:  After adequate premedication, the patient was taken to the   Operating Room, placed on the operating table in supine position.  General   anesthesia administered.  The lower abdomen and right groin were prepped and   draped in sterile fashion.  A low transverse incision was made, deepened down   through the subcutaneous tissue through Opal fascia, bleeding was controlled   with a cautery.  The hernia was then easily identified, could not be reduced.    The hernia sac was opened, was noted to contain a knuckle of small bowel, which   appeared viable.  The external ring was incised.  The bowel could then be   reduced into the peritoneal cavity.  The large hernia sac was then ligated with   2 transfixion sutures of 2-0 Vicryl and the distal portion of the sac removed.    Repair of the defect was then carried out using a Prolene mesh, sutured in place   with 2-0 Vicryl beginning at the pubic tubercle extending medially to the   shelving edge of the inguinal ligament, laterally to the transversalis fascia.    A slit was placed in the mesh to encompass the cord.  New additional sutures   were placed to tighten the internal ring.  Once this was accomplished, the cord   was placed in its normal position.  The external oblique was partially closed.    The Opal fascia was closed with a running 2-0 Vicryl and the skin closed with   staples.  Blood loss was approximately 50 mL.  The patient tolerated the   procedure and was transported to Recovery in stable condition.      WENDY/IN  dd: 01/26/2018 06:08:21 (CST)  td: 01/26/2018 07:29:31 ([facility])  Doc ID   #[id number]  Job ID #[id number]    CC:

## 2018-01-26 NOTE — NURSING
Assisted patient to bathroom. Patient opened the door and looked diaphoretic and had a near syncope episode. Patient's spouse and RN assisted patient to nearest chair.  Assessed patient's vitals: BP- 90/57 HR: 134, O2-100% Resp- 19, Blood glucose- 148. Last dose of pain medication was given at 1417. Assisted patient back to bed. Call placed to MD to notify of above. New orders to follow from Dr. Palacios.

## 2018-01-26 NOTE — H&P
HISTORY OF PRESENT ILLNESS:  This is a 42-year-old male who presents to the   Emergency Room with an irreducible right inguinal hernia associated with pain,   nausea and vomiting.    PAST MEDICAL HISTORY:  Significant for previous sleeve for morbid obesity,   gastroesophageal reflux, history of meningitis in the past, and cataract   extraction.    SOCIAL HISTORY:  The patient is a current smoker.    REVIEW OF SYSTEMS:  Unremarkable except for the above.    PHYSICAL EXAMINATION:  GENERAL:  Shows a somewhat obese male in no acute distress.  HEAD, EYES, EARS, NOSE AND THROAT:  Normal.  NECK:  Supple.  CHEST:  Clear to percussion and auscultation.  HEART:  Shows a regular sinus rhythm without murmurs.  ABDOMEN:  Soft, flat, nontender.  The only significant finding is a large   irreducible tender right inguinal hernia.  EXTREMITIES:  Show no deformity.  NEUROLOGIC:  Virtually intact.    IMPRESSION:  Incarcerated right inguinal hernia.      CIERA  dd: 01/26/2018 06:11:55 (CST)  td: 01/26/2018 07:09:44 (CST)  Doc ID   #[id number]  Job ID #[id number]    CC:

## 2018-01-26 NOTE — NURSING
Patient's spouse heard screaming for help. RN and other staff members walked into room and found patient lying on the ground with patient's spouse holding his head to prevent it hit from hitting the floor. According to patient's spouse, patient was sitting on side of the bed trying to urinate when he begin to slip off the slide of the bed. Patient's VS; 75/40, HR: 103, O2: 97, Blood glucose 119. Patient found to be diaphoretic with clammy skin. Patient was alert and oriented, but did have some confusion as to why he was on the ground. Call placed to MD to inform him of above. New orders given from Dr. Valles as follows:    Stat KUB  500 cc bolus of normal saline

## 2018-01-27 LAB
ANION GAP SERPL CALC-SCNC: 5 MMOL/L
BASOPHILS # BLD AUTO: 0.02 K/UL
BASOPHILS NFR BLD: 0.2 %
BUN SERPL-MCNC: 10 MG/DL
CALCIUM SERPL-MCNC: 8.8 MG/DL
CHLORIDE SERPL-SCNC: 104 MMOL/L
CO2 SERPL-SCNC: 30 MMOL/L
CREAT SERPL-MCNC: 0.7 MG/DL
DIFFERENTIAL METHOD: ABNORMAL
EOSINOPHIL # BLD AUTO: 0.2 K/UL
EOSINOPHIL NFR BLD: 1.2 %
ERYTHROCYTE [DISTWIDTH] IN BLOOD BY AUTOMATED COUNT: 14.4 %
EST. GFR  (AFRICAN AMERICAN): >60 ML/MIN/1.73 M^2
EST. GFR  (NON AFRICAN AMERICAN): >60 ML/MIN/1.73 M^2
GLUCOSE SERPL-MCNC: 100 MG/DL
HCT VFR BLD AUTO: 29.7 %
HGB BLD-MCNC: 10.1 G/DL
LYMPHOCYTES # BLD AUTO: 2.9 K/UL
LYMPHOCYTES NFR BLD: 23.1 %
MAGNESIUM SERPL-MCNC: 1.6 MG/DL
MCH RBC QN AUTO: 33.6 PG
MCHC RBC AUTO-ENTMCNC: 34 G/DL
MCV RBC AUTO: 99 FL
MONOCYTES # BLD AUTO: 1.1 K/UL
MONOCYTES NFR BLD: 8.5 %
NEUTROPHILS # BLD AUTO: 8.5 K/UL
NEUTROPHILS NFR BLD: 67 %
PHOSPHATE SERPL-MCNC: 3.1 MG/DL
PLATELET # BLD AUTO: 256 K/UL
PMV BLD AUTO: 10.6 FL
POTASSIUM SERPL-SCNC: 3.9 MMOL/L
RBC # BLD AUTO: 3.01 M/UL
SODIUM SERPL-SCNC: 139 MMOL/L
WBC # BLD AUTO: 12.71 K/UL

## 2018-01-27 PROCEDURE — 85025 COMPLETE CBC W/AUTO DIFF WBC: CPT

## 2018-01-27 PROCEDURE — 83735 ASSAY OF MAGNESIUM: CPT

## 2018-01-27 PROCEDURE — 36415 COLL VENOUS BLD VENIPUNCTURE: CPT

## 2018-01-27 PROCEDURE — 80048 BASIC METABOLIC PNL TOTAL CA: CPT

## 2018-01-27 PROCEDURE — 12000002 HC ACUTE/MED SURGE SEMI-PRIVATE ROOM

## 2018-01-27 PROCEDURE — 25000003 PHARM REV CODE 250: Performed by: STUDENT IN AN ORGANIZED HEALTH CARE EDUCATION/TRAINING PROGRAM

## 2018-01-27 PROCEDURE — 63600175 PHARM REV CODE 636 W HCPCS: Performed by: EMERGENCY MEDICINE

## 2018-01-27 PROCEDURE — 84100 ASSAY OF PHOSPHORUS: CPT

## 2018-01-27 RX ADMIN — DOCUSATE SODIUM 100 MG: 100 CAPSULE, LIQUID FILLED ORAL at 08:01

## 2018-01-27 RX ADMIN — SODIUM CHLORIDE: 0.9 INJECTION, SOLUTION INTRAVENOUS at 08:01

## 2018-01-27 RX ADMIN — ENOXAPARIN SODIUM 40 MG: 100 INJECTION SUBCUTANEOUS at 05:01

## 2018-01-27 RX ADMIN — PANTOPRAZOLE SODIUM 40 MG: 40 TABLET, DELAYED RELEASE ORAL at 08:01

## 2018-01-27 RX ADMIN — HYDROCODONE BITARTRATE AND ACETAMINOPHEN 1 TABLET: 5; 325 TABLET ORAL at 08:01

## 2018-01-27 RX ADMIN — HYDROMORPHONE HYDROCHLORIDE 1 MG: 2 INJECTION, SOLUTION INTRAMUSCULAR; INTRAVENOUS; SUBCUTANEOUS at 08:01

## 2018-01-27 NOTE — PLAN OF CARE
Problem: Patient Care Overview  Goal: Plan of Care Review  Outcome: Ongoing (interventions implemented as appropriate)   01/26/18 2387   Coping/Psychosocial   Plan Of Care Reviewed With patient     Patient remained free from injuries and trauma throughout shift. Pain controlled with prn analgesics. No complaints of nausea or vomiting. IV fluids and medications administered as prescribed. Ambulated to and from bathroom with assistance from RN; experienced a near syncope episode. Tolerated clear liquid diet well. Spouse at bedside, very supportive in care. No acute distress noted.

## 2018-01-27 NOTE — PLAN OF CARE
Problem: Patient Care Overview  Goal: Plan of Care Review  Outcome: Ongoing (interventions implemented as appropriate)  No falls, trauma or injury this shift. Pain managed with oral medications. Abdominal surgical incision dressing remains dry and intact. Blood pressure 119/68. Patient has ambulated to the bathroom, encouraged patient to ambulate in the hallway with assistance, patient stated he will ambulate in the morning. Continue with plan of care and continue to monitor patient.

## 2018-01-27 NOTE — PLAN OF CARE
Problem: Patient Care Overview  Goal: Plan of Care Review  Outcome: Ongoing (interventions implemented as appropriate)  Patient remains free of falls or injuries, ambulating hallway with assist. Patient denies dizziness when ambulating. Fall precautions maintained. IVF's infusing per orders. Temp 99. Patient encouraged to turn, cough and breathe deeply in addition to using IS. Pain well controlled with po pain meds. Patient's diet clears, awaiting advancement. Will continue to monitor   01/27/18 1244   Coping/Psychosocial   Plan Of Care Reviewed With patient;spouse

## 2018-01-28 VITALS
WEIGHT: 211.44 LBS | HEIGHT: 70 IN | DIASTOLIC BLOOD PRESSURE: 65 MMHG | SYSTOLIC BLOOD PRESSURE: 114 MMHG | HEART RATE: 111 BPM | OXYGEN SATURATION: 98 % | RESPIRATION RATE: 18 BRPM | BODY MASS INDEX: 30.27 KG/M2 | TEMPERATURE: 98 F

## 2018-01-28 LAB
ANION GAP SERPL CALC-SCNC: 7 MMOL/L
BASOPHILS # BLD AUTO: 0.01 K/UL
BASOPHILS NFR BLD: 0.1 %
BUN SERPL-MCNC: 8 MG/DL
CALCIUM SERPL-MCNC: 9.1 MG/DL
CHLORIDE SERPL-SCNC: 104 MMOL/L
CO2 SERPL-SCNC: 29 MMOL/L
CREAT SERPL-MCNC: 0.7 MG/DL
DIFFERENTIAL METHOD: ABNORMAL
EOSINOPHIL # BLD AUTO: 0.3 K/UL
EOSINOPHIL NFR BLD: 2 %
ERYTHROCYTE [DISTWIDTH] IN BLOOD BY AUTOMATED COUNT: 14.4 %
EST. GFR  (AFRICAN AMERICAN): >60 ML/MIN/1.73 M^2
EST. GFR  (NON AFRICAN AMERICAN): >60 ML/MIN/1.73 M^2
GLUCOSE SERPL-MCNC: 87 MG/DL
HCT VFR BLD AUTO: 26.9 %
HGB BLD-MCNC: 9 G/DL
LYMPHOCYTES # BLD AUTO: 2 K/UL
LYMPHOCYTES NFR BLD: 15.7 %
MAGNESIUM SERPL-MCNC: 1.7 MG/DL
MCH RBC QN AUTO: 33.3 PG
MCHC RBC AUTO-ENTMCNC: 33.5 G/DL
MCV RBC AUTO: 100 FL
MONOCYTES # BLD AUTO: 1.2 K/UL
MONOCYTES NFR BLD: 9.3 %
NEUTROPHILS # BLD AUTO: 9.3 K/UL
NEUTROPHILS NFR BLD: 72.9 %
PHOSPHATE SERPL-MCNC: 2.3 MG/DL
PLATELET # BLD AUTO: 266 K/UL
PMV BLD AUTO: 10.8 FL
POTASSIUM SERPL-SCNC: 4 MMOL/L
RBC # BLD AUTO: 2.7 M/UL
SODIUM SERPL-SCNC: 140 MMOL/L
WBC # BLD AUTO: 12.69 K/UL

## 2018-01-28 PROCEDURE — 85025 COMPLETE CBC W/AUTO DIFF WBC: CPT

## 2018-01-28 PROCEDURE — 36415 COLL VENOUS BLD VENIPUNCTURE: CPT

## 2018-01-28 PROCEDURE — 25000003 PHARM REV CODE 250: Performed by: STUDENT IN AN ORGANIZED HEALTH CARE EDUCATION/TRAINING PROGRAM

## 2018-01-28 PROCEDURE — 80048 BASIC METABOLIC PNL TOTAL CA: CPT

## 2018-01-28 PROCEDURE — 84100 ASSAY OF PHOSPHORUS: CPT

## 2018-01-28 PROCEDURE — 63600175 PHARM REV CODE 636 W HCPCS: Performed by: STUDENT IN AN ORGANIZED HEALTH CARE EDUCATION/TRAINING PROGRAM

## 2018-01-28 PROCEDURE — 83735 ASSAY OF MAGNESIUM: CPT

## 2018-01-28 RX ORDER — ONDANSETRON 4 MG/1
4 TABLET, ORALLY DISINTEGRATING ORAL EVERY 8 HOURS PRN
Qty: 10 TABLET | Refills: 0 | Status: SHIPPED | OUTPATIENT
Start: 2018-01-28 | End: 2018-02-02

## 2018-01-28 RX ORDER — MAGNESIUM SULFATE 1 G/100ML
1 INJECTION INTRAVENOUS ONCE
Status: COMPLETED | OUTPATIENT
Start: 2018-01-28 | End: 2018-01-28

## 2018-01-28 RX ORDER — DOCUSATE SODIUM 100 MG/1
100 CAPSULE, LIQUID FILLED ORAL 2 TIMES DAILY
Qty: 28 CAPSULE | Refills: 0 | Status: SHIPPED | OUTPATIENT
Start: 2018-01-28 | End: 2018-02-11

## 2018-01-28 RX ORDER — HYDROCODONE BITARTRATE AND ACETAMINOPHEN 5; 325 MG/1; MG/1
1 TABLET ORAL EVERY 6 HOURS PRN
Qty: 30 TABLET | Refills: 0 | Status: ON HOLD | OUTPATIENT
Start: 2018-01-28 | End: 2018-05-31

## 2018-01-28 RX ORDER — SODIUM,POTASSIUM PHOSPHATES 280-250MG
1 POWDER IN PACKET (EA) ORAL 2 TIMES DAILY
Status: DISCONTINUED | OUTPATIENT
Start: 2018-01-28 | End: 2018-01-28 | Stop reason: HOSPADM

## 2018-01-28 RX ADMIN — DOCUSATE SODIUM 100 MG: 100 CAPSULE, LIQUID FILLED ORAL at 09:01

## 2018-01-28 RX ADMIN — MAGNESIUM SULFATE IN DEXTROSE 1 G: 10 INJECTION, SOLUTION INTRAVENOUS at 09:01

## 2018-01-28 RX ADMIN — PANTOPRAZOLE SODIUM 40 MG: 40 TABLET, DELAYED RELEASE ORAL at 09:01

## 2018-01-28 RX ADMIN — POTASSIUM & SODIUM PHOSPHATES POWDER PACK 280-160-250 MG 1 PACKET: 280-160-250 PACK at 09:01

## 2018-01-28 NOTE — NURSING
Discharge instructions reviewed with patient including patient teaching. Patient and spouse verbalizing understanding

## 2018-01-28 NOTE — DISCHARGE SUMMARY
[facility]  General Surgery  Discharge Summary      Patient Name: Ever Rubin III  MRN: [id number]  Admission Date: 1/25/2018  Hospital Length of Stay: 2 days  Discharge Date and Time:  01/28/2018 2:00 PM  Attending Physician: Brent Boudreaux MD   Discharging Provider: LUIS Live  Primary Care Provider: Bhupinder Howell MD     HPI: 41 yo man with history of laparoscopic sleeve gastrectomy, GERD presented with an incarcerated right inguinal hernia.     Procedure(s) (LRB):  REPAIR-HERNIA-INGUINAL-INITIAL (5 YRS+) (Right)     Hospital Course: An open right inguinal hernia repair with mesh was performed on 1/26/18. The patient tolerated the procedure well and was managed on the floor until return of bowel function was noted. He was discharged when pain was controlled, voiding and ambulating without difficulty, eating regular food with BMs.    Consults: None    Significant Diagnostic Studies: Labs: All labs within the past 24 hours have been reviewed    Pending Diagnostic Studies:     None        Final Active Diagnoses:    Diagnosis Date Noted POA    PRINCIPAL PROBLEM:  Incarcerated right inguinal hernia [K40.30] 01/26/2018 Yes      Problems Resolved During this Admission:    Diagnosis Date Noted Date Resolved POA      Discharged Condition: stable    Disposition: Home or Self Care    Follow Up:  Follow-up Information     Brent Boudreaux MD On 2/6/2018.    Specialty:  General Surgery  Why:  Tuesday at 2:15pm.   Contact information:  [address] [place] [address] [id number] [phone]                 Patient Instructions:     Diet Adult Regular     Lifting restrictions   Order Comments: Do not lift objects heavier than a gallon of milk for 2 weeks     No driving until:   Order Comments: Off narcotics     Notify your health care provider if you experience any of the following:  temperature >100.4     Notify your health care provider if you experience any of the following:  persistent  nausea and vomiting or diarrhea     Notify your health care provider if you experience any of the following:  severe uncontrolled pain     Notify your health care provider if you experience any of the following:  redness, tenderness, or signs of infection (pain, swelling, redness, odor or green/yellow discharge around incision site)     Notify your health care provider if you experience any of the following:  difficulty breathing or increased cough     Notify your health care provider if you experience any of the following:  severe persistent headache     Notify your health care provider if you experience any of the following:  persistent dizziness, light-headedness, or visual disturbances     Notify your health care provider if you experience any of the following:  worsening rash     Notify your health care provider if you experience any of the following:  increased confusion or weakness     No dressing needed       Medications:  Reconciled Home Medications:   Discharge Medication List as of 1/28/2018  2:07 PM      START taking these medications    Details   docusate sodium (COLACE) 100 MG capsule Take 1 capsule (100 mg total) by mouth 2 (two) times daily., Starting Sun 1/28/2018, Until Sun 2/11/2018, Print      hydrocodone-acetaminophen 5-325mg (NORCO) 5-325 mg per tablet Take 1 tablet by mouth every 6 (six) hours as needed for Pain., Starting Sun 1/28/2018, Print      ondansetron (ZOFRAN-ODT) 4 MG TbDL Take 1 tablet (4 mg total) by mouth every 8 (eight) hours as needed (Nausea/vomiting)., Starting Sun 1/28/2018, Print         CONTINUE these medications which have NOT CHANGED    Details   ibuprofen (ADVIL,MOTRIN) 800 MG tablet Starting 3/31/2016, Until Discontinued, Historical Med      omeprazole (PRILOSEC) 40 MG capsule TAKE ONE CAPSULE BY MOUTH DAILY, Normal      VYVANSE 60 mg capsule Starting 8/15/2015, Until Discontinued, Historical Med         STOP taking these medications       azelastine-fluticasone 137-50  mcg/spray [place] nassal spray Comments:   Reason for Stopping:         benzonatate (TESSALON) 200 MG capsule Comments:   Reason for Stopping:         clindamycin phosphate 1% (CLINDAGEL) 1 % gel Comments:   Reason for Stopping:         diclofenac sodium 1 % Gel Comments:   Reason for Stopping:         FLOVENT  mcg/actuation inhaler Comments:   Reason for Stopping:         loratadine-pseudoephedrine  mg (WAL-ITIN D)  mg per 24 hr tablet Comments:   Reason for Stopping:         prednisoLONE acetate (PRED FORTE) 1 % DrpS Comments:   Reason for Stopping:         trazodone (DESYREL) 50 MG tablet Comments:   Reason for Stopping:         ZIANA gel Comments:   Reason for Stopping:               LUIS Live  General Surgery  [facility]

## 2018-01-28 NOTE — PLAN OF CARE
Problem: Patient Care Overview  Goal: Plan of Care Review  Outcome: Ongoing (interventions implemented as appropriate)   01/28/18 0422   Pain   Plan Of Care Reviewed With patient   Pain is managed with current regimen.   01/28/18 0422   Infection   Plan Of Care Reviewed With patient   Patient tolerating antibiotics well.

## 2018-01-28 NOTE — PLAN OF CARE
01/28/18 1417   Final Note   Assessment Type Final Discharge Note   Discharge Disposition Home   What phone number can be called within the next 1-3 days to see how you are doing after discharge? ([phone])   Hospital Follow Up  Appt(s) scheduled? Yes   Discharge plans and expectations educations in teach back method with documentation complete? Yes   Right Care Referral Info   Post Acute Recommendation No Care

## 2018-01-28 NOTE — PROGRESS NOTES
[facility]  General Surgery  Progress Note     Subjective:      Interval History: Occasional cramping pain, tolerating clears, wants regular diet.      Post-Op Info:  Open right inguinal hernia repair, POD #1      Objective:   Vital Signs:   Vitals:    01/27/18 0759 01/27/18 1208 01/27/18 1749 01/27/18 1915   BP: 119/63 109/62 135/78 128/70   BP Location: Left arm Right arm Right arm Right arm   Patient Position: Lying Sitting Lying Lying   Pulse: 86 98 105 101   Resp: 17 18 18 17   Temp: 98.5 °F (36.9 °C) 99.6 °F (37.6 °C) 98.6 °F (37 °C) 99.6 °F (37.6 °C)   TempSrc: Oral Oral Oral Oral   SpO2: 99% 99% 99% 98%   Weight:       Height:          Intake/Output Summary (Last 24 hours):    Intake/Output Summary (Last 24 hours) at 01/27/18 2037  Last data filed at 01/27/18 1800   Gross per 24 hour   Intake          4240.83 ml   Output             2032 ml   Net          2208.83 ml      Physical Exam:  General appearance: awake, alert, NAD  Abdomen: soft, NTND  : Right inguinal dressing intact     Significant Labs:  Labs reviewed     Assessment/Plan:   41 yo man with history of laparoscopic sleeve gastrectomy, GERD presenting with an incarcerated right inguinal hernia, s/p open right inguinal hernia repair with mesh, POD #2    -Monitor H&H  -Regular diet  -d/c IV fluids   -PPI  -OOB tid, ambulate in hallway  -Bowel regimen  -Lovenox/SCDs  -Likely d/c home tomorrow     Jonathon Echavarria MD  General Surgery  [facility]

## 2018-02-02 ENCOUNTER — OFFICE VISIT (OUTPATIENT)
Dept: FAMILY MEDICINE | Facility: CLINIC | Age: 43
End: 2018-02-02
Payer: COMMERCIAL

## 2018-02-02 VITALS
DIASTOLIC BLOOD PRESSURE: 70 MMHG | SYSTOLIC BLOOD PRESSURE: 112 MMHG | BODY MASS INDEX: 28.58 KG/M2 | HEART RATE: 94 BPM | HEIGHT: 70 IN | OXYGEN SATURATION: 97 % | WEIGHT: 199.63 LBS | TEMPERATURE: 99 F

## 2018-02-02 DIAGNOSIS — Z98.890 HX OF INGUINAL HERNIA SURGERY: Primary | ICD-10-CM

## 2018-02-02 DIAGNOSIS — F17.200 TOBACCO DEPENDENCE: ICD-10-CM

## 2018-02-02 DIAGNOSIS — J31.0 RHINITIS, UNSPECIFIED CHRONICITY, UNSPECIFIED TYPE: ICD-10-CM

## 2018-02-02 DIAGNOSIS — Z87.19 HX OF INGUINAL HERNIA SURGERY: Primary | ICD-10-CM

## 2018-02-02 PROBLEM — K40.30 INCARCERATED RIGHT INGUINAL HERNIA: Status: RESOLVED | Noted: 2018-01-26 | Resolved: 2018-02-02

## 2018-02-02 PROCEDURE — 3008F BODY MASS INDEX DOCD: CPT | Mod: S$GLB,,, | Performed by: FAMILY MEDICINE

## 2018-02-02 PROCEDURE — 99999 PR PBB SHADOW E&M-EST. PATIENT-LVL III: CPT | Mod: PBBFAC,,, | Performed by: FAMILY MEDICINE

## 2018-02-02 PROCEDURE — 99214 OFFICE O/P EST MOD 30 MIN: CPT | Mod: S$GLB,,, | Performed by: FAMILY MEDICINE

## 2018-02-02 RX ORDER — CYCLOBENZAPRINE HCL 10 MG
10 TABLET ORAL 3 TIMES DAILY PRN
COMMUNITY
Start: 2017-12-23

## 2018-02-02 RX ORDER — SULFAMETHOXAZOLE AND TRIMETHOPRIM 800; 160 MG/1; MG/1
TABLET ORAL
COMMUNITY
Start: 2017-12-23 | End: 2018-02-02 | Stop reason: ALTCHOICE

## 2018-02-02 RX ORDER — MUPIROCIN 20 MG/G
OINTMENT TOPICAL
COMMUNITY
Start: 2017-12-23 | End: 2018-02-02 | Stop reason: ALTCHOICE

## 2018-02-02 RX ORDER — DOXYCYCLINE 100 MG/1
CAPSULE ORAL
COMMUNITY
Start: 2017-11-11 | End: 2018-02-02 | Stop reason: ALTCHOICE

## 2018-02-02 NOTE — PROGRESS NOTES
[facility]  Progress Note    SUBJECTIVE:     Chief Complaint   Patient presents with    [facility]       HPI   Ever Rubin III  is a 42 y.o. male here to establish care. He recently had R inguinal hernia repair surgery due to obstruction. Surgery went uncomplicated. He admits still smoking but will work on it after he gets down to his goal weight (had gastric sleeve prior). Doing well and has no new complaints/problems at this time.    Review of patient's allergies indicates:   Allergen Reactions    Penicillins Hives       Past Medical History:   Diagnosis Date    ADHD (attention deficit hyperactivity disorder)     Allergy     Bronchitis     Cataract     Depression     GERD (gastroesophageal reflux disease)     Meningitis     Snores      Past Surgical History:   Procedure Laterality Date    CATARACT EXTRACTION W/ INTRAOCULAR LENS  IMPLANT, BILATERAL      EYE SURGERY      HERNIA REPAIR  01/26/2018    SHOULDER SURGERY      SLEEVE GASTROPLASTY       History reviewed. No pertinent family history.  Social History   Substance Use Topics    Smoking status: Current Every Day Smoker     Packs/day: 0.50    Smokeless tobacco: Never Used    Alcohol use No        Review of Systems   Constitutional: Negative for chills and fever.   HENT: Negative for hearing loss.    Eyes: Negative for discharge.   Respiratory: Negative for wheezing.    Cardiovascular: Negative for chest pain and palpitations.   Gastrointestinal: Negative for abdominal pain, blood in stool, constipation, diarrhea, heartburn and vomiting.   Genitourinary: Negative for hematuria and urgency.   Musculoskeletal: Negative for neck pain.   Skin:        + incision from recent hernia surgery   Neurological: Negative for weakness and headaches.   Endo/Heme/Allergies: Negative for polydipsia.     OBJECTIVE:     Vitals:    02/02/18 1049   BP: 112/70   Pulse: 94   Temp: 98.7 °F (37.1 °C)     Body mass index is 28.64 kg/m².    Physical Exam    Constitutional: He is oriented to person, place, and time and well-developed, well-nourished, and in no distress. No distress.   HENT:   Head: Normocephalic and atraumatic.   Nose: Nose normal.   Eyes: Conjunctivae and EOM are normal.   Cardiovascular: Normal rate, regular rhythm and normal heart sounds.  Exam reveals no gallop and no friction rub.    No murmur heard.  Pulmonary/Chest: Effort normal and breath sounds normal. No respiratory distress. He has no wheezes. He has no rales. He exhibits no tenderness.   Abdominal: Soft. Bowel sounds are normal. He exhibits no distension. There is no tenderness. There is no rebound.   Neurological: He is alert and oriented to person, place, and time.   Skin: Skin is warm. He is not diaphoretic.   + well healing skin incision on RLQ abdominal area, which is held together by staples. No discharge, erythema. Some bruising/swelling noted but is absorbing       Old records were reviewed. Labs and/or images were independently reviewed.    ASSESSMENT     1. Hx of inguinal hernia surgery    2. Tobacco dependence    3. Rhinitis, unspecified chronicity, unspecified type        PLAN:     Hx of inguinal hernia surgery   -     Follow-up with general surgery. Incision healing well, no abx indicated.    Tobacco dependence   -     Counseled patient about importance of smoking cessation. Patient ready to quit. Will start smoking cessation treatment options.   -     He will try on his own first, after he gets to his goal weight loss.    Rhinitis, unspecified chronicity, unspecified type  -     loratadine-pseudoephedrine  mg (CLARITIN-D 24-HOUR)  mg per 24 hr tablet; Take 1 tablet by mouth daily as needed for Allergies.  Dispense: 90 tablet; Refill: 3      RTC PRVILMA Crabtree MD  02/02/2018 1:50 PM

## 2018-02-28 ENCOUNTER — PATIENT MESSAGE (OUTPATIENT)
Dept: CASE MANAGEMENT | Facility: HOSPITAL | Age: 43
End: 2018-02-28

## 2018-05-16 ENCOUNTER — HOSPITAL ENCOUNTER (OUTPATIENT)
Dept: PREADMISSION TESTING | Facility: HOSPITAL | Age: 43
Discharge: HOME | End: 2018-05-16
Attending: SURGERY
Payer: COMMERCIAL

## 2018-05-16 VITALS
HEIGHT: 70 IN | SYSTOLIC BLOOD PRESSURE: 114 MMHG | RESPIRATION RATE: 16 BRPM | WEIGHT: 185.19 LBS | DIASTOLIC BLOOD PRESSURE: 67 MMHG | HEART RATE: 89 BPM | BODY MASS INDEX: 26.51 KG/M2 | TEMPERATURE: 98 F | OXYGEN SATURATION: 98 %

## 2018-05-16 DIAGNOSIS — Z01.818 PRE-OP TESTING: Primary | ICD-10-CM

## 2018-05-16 LAB
ALBUMIN SERPL BCP-MCNC: 3.9 G/DL
ALP SERPL-CCNC: 75 U/L
ALT SERPL W/O P-5'-P-CCNC: 15 U/L
ANION GAP SERPL CALC-SCNC: 7 MMOL/L
AST SERPL-CCNC: 20 U/L
BASOPHILS # BLD AUTO: 0.02 K/UL
BASOPHILS NFR BLD: 0.2 %
BILIRUB SERPL-MCNC: 0.4 MG/DL
BUN SERPL-MCNC: 13 MG/DL
CALCIUM SERPL-MCNC: 9.7 MG/DL
CHLORIDE SERPL-SCNC: 103 MMOL/L
CO2 SERPL-SCNC: 29 MMOL/L
CREAT SERPL-MCNC: 0.9 MG/DL
DIFFERENTIAL METHOD: ABNORMAL
EOSINOPHIL # BLD AUTO: 0.4 K/UL
EOSINOPHIL NFR BLD: 3.7 %
ERYTHROCYTE [DISTWIDTH] IN BLOOD BY AUTOMATED COUNT: 14.5 %
EST. GFR  (AFRICAN AMERICAN): >60 ML/MIN/1.73 M^2
EST. GFR  (NON AFRICAN AMERICAN): >60 ML/MIN/1.73 M^2
GLUCOSE SERPL-MCNC: 91 MG/DL
HCT VFR BLD AUTO: 47.8 %
HGB BLD-MCNC: 16.7 G/DL
LYMPHOCYTES # BLD AUTO: 1.9 K/UL
LYMPHOCYTES NFR BLD: 19.7 %
MCH RBC QN AUTO: 33.3 PG
MCHC RBC AUTO-ENTMCNC: 34.9 G/DL
MCV RBC AUTO: 95 FL
MONOCYTES # BLD AUTO: 0.8 K/UL
MONOCYTES NFR BLD: 8.2 %
NEUTROPHILS # BLD AUTO: 6.6 K/UL
NEUTROPHILS NFR BLD: 68.2 %
PLATELET # BLD AUTO: 277 K/UL
PMV BLD AUTO: 10.5 FL
POTASSIUM SERPL-SCNC: 4 MMOL/L
PROT SERPL-MCNC: 6.7 G/DL
RBC # BLD AUTO: 5.02 M/UL
SODIUM SERPL-SCNC: 139 MMOL/L
WBC # BLD AUTO: 9.74 K/UL

## 2018-05-16 PROCEDURE — 80053 COMPREHEN METABOLIC PANEL: CPT

## 2018-05-16 PROCEDURE — 36415 COLL VENOUS BLD VENIPUNCTURE: CPT

## 2018-05-16 PROCEDURE — 85025 COMPLETE CBC W/AUTO DIFF WBC: CPT

## 2018-05-16 RX ORDER — MULTIVITAMIN
1 TABLET ORAL DAILY
COMMUNITY

## 2018-05-16 RX ORDER — ASCORBIC ACID 500 MG
500 TABLET ORAL DAILY
COMMUNITY

## 2018-05-16 NOTE — DISCHARGE INSTRUCTIONS
For this procedure you will shower at home the night before and also the morning of surgery with HIBICLENS soap provided by the pre op nurse. Do not use this soap on your face or genitals. You will shower a third time here at the hospital on the morning of surgery. Rinse completely after each shower.  Please place clean linens on your bed the night before surgery. Please wear fresh clean clothing after each shower.  No shaving of procedural area at least 4-5 days before surgery due to  increased risk of skin irritation and/or possible infection.      Your surgery is scheduled for Thursday May 31, 2018___.    Call [phone] between 2 p.m. and 5 p.m. on   Wednesday___ to find out your arrival time for the day of your surgery.      Please report to SAME DAY SURGERY UNIT on the 2nd FLOOR at _______ a.m.  Use front door entrance. The doors open at 0530 am.          INSTRUCTIONS IMPORTANT!!!  ¨ Do not eat or drink after 12 midnight-including water. OK to brush teeth, no   gum, candy or mints!      _x___  Prep instructions:  SHOWER     _x___  Please shower using Hibiclens soap the night before AND  the morning of  your surgery/procedure. Do not use Hibiclens on your face or genitals       x        If your surgery is around your belly button (Navel) be sure to wash inside your  belly button also. Rinse hibiclens off completely.  _x___  No shaving of procedural area at least 4-5 days before surgery due to  increased risk of skin irritation and/or possible infection.  ____  Do not wear makeup, including mascara. WEARING EYE MAKEUP MAY  LEAD TO SERIOUS EYE INJURY during surgery.  _x___  No powder, lotions or creams to your body.  _x___  You may wear only deodorant on the day of surgery.  _x___  Please remove all jewelry, including piercings and leave at home.  _x___  No money or valuables needed. Please leave at home.  You may bring your  cell phone.  ____  Please bring any documents given by your doctor.  _x___  If going home  the same day, arrange for a ride home. You will not be able to   drive if Anesthesia was used.  ____  Children under 18 years require a parent / guardian present the entire time   they are in surgery / recovery.  _x___  Wear loose fitting clothing. Allow for dressings, bandages.  _x___  Stop Aspirin, Ibuprofen, Motrin and Aleve at least 3-5 days before  surgery, unless otherwise instructed by your doctor, or the nurse.              You MAY use Tylenol/acetaminophen until day of surgery.  ____  If you take diabetic medication, do not take am of surgery unless instructed by   Doctor.  _x__  Call MD for temperature above 101 degrees.        _x___ Stop taking any Fish Oil supplement or any Vitamins that contain Vitamin  E at least 5 days prior to surgery.          I have read or had read and explained to me, and understand the above information.  Additional comments or instructions:Please call   [phone] if you have any questions regarding the instructions above.

## 2018-05-31 ENCOUNTER — ANESTHESIA (OUTPATIENT)
Dept: SURGERY | Facility: HOSPITAL | Age: 43
End: 2018-05-31
Payer: COMMERCIAL

## 2018-05-31 ENCOUNTER — HOSPITAL ENCOUNTER (OUTPATIENT)
Facility: HOSPITAL | Age: 43
Discharge: HOME | End: 2018-05-31
Attending: SURGERY | Admitting: SURGERY
Payer: COMMERCIAL

## 2018-05-31 ENCOUNTER — SURGERY (OUTPATIENT)
Age: 43
End: 2018-05-31

## 2018-05-31 ENCOUNTER — ANESTHESIA EVENT (OUTPATIENT)
Dept: SURGERY | Facility: HOSPITAL | Age: 43
End: 2018-05-31
Payer: COMMERCIAL

## 2018-05-31 VITALS
TEMPERATURE: 99 F | OXYGEN SATURATION: 99 % | WEIGHT: 185.19 LBS | SYSTOLIC BLOOD PRESSURE: 108 MMHG | RESPIRATION RATE: 15 BRPM | HEART RATE: 61 BPM | BODY MASS INDEX: 26.51 KG/M2 | DIASTOLIC BLOOD PRESSURE: 74 MMHG | HEIGHT: 70 IN

## 2018-05-31 DIAGNOSIS — K80.20 CALCULUS OF GALLBLADDER WITHOUT CHOLECYSTITIS WITHOUT OBSTRUCTION: Primary | ICD-10-CM

## 2018-05-31 PROCEDURE — 36000709 HC OR TIME LEV III EA ADD 15 MIN: Performed by: SURGERY

## 2018-05-31 PROCEDURE — 63600175 PHARM REV CODE 636 W HCPCS: Performed by: SURGERY

## 2018-05-31 PROCEDURE — 25000003 PHARM REV CODE 250: Performed by: SURGERY

## 2018-05-31 PROCEDURE — D9220A PRA ANESTHESIA: Mod: ANES,,, | Performed by: ANESTHESIOLOGY

## 2018-05-31 PROCEDURE — 36000708 HC OR TIME LEV III 1ST 15 MIN: Performed by: SURGERY

## 2018-05-31 PROCEDURE — 88304 TISSUE EXAM BY PATHOLOGIST: CPT | Performed by: PATHOLOGY

## 2018-05-31 PROCEDURE — 63600175 PHARM REV CODE 636 W HCPCS: Performed by: NURSE ANESTHETIST, CERTIFIED REGISTERED

## 2018-05-31 PROCEDURE — 25000003 PHARM REV CODE 250: Performed by: NURSE ANESTHETIST, CERTIFIED REGISTERED

## 2018-05-31 PROCEDURE — 71000033 HC RECOVERY, INTIAL HOUR: Performed by: SURGERY

## 2018-05-31 PROCEDURE — 37000008 HC ANESTHESIA 1ST 15 MINUTES: Performed by: SURGERY

## 2018-05-31 PROCEDURE — D9220A PRA ANESTHESIA: Mod: CRNA,,, | Performed by: NURSE ANESTHETIST, CERTIFIED REGISTERED

## 2018-05-31 PROCEDURE — 37000009 HC ANESTHESIA EA ADD 15 MINS: Performed by: SURGERY

## 2018-05-31 PROCEDURE — 71000015 HC POSTOP RECOV 1ST HR: Performed by: SURGERY

## 2018-05-31 PROCEDURE — C9290 INJ, BUPIVACAINE LIPOSOME: HCPCS | Performed by: SURGERY

## 2018-05-31 PROCEDURE — 88304 TISSUE EXAM BY PATHOLOGIST: CPT | Mod: 26,,, | Performed by: PATHOLOGY

## 2018-05-31 PROCEDURE — 71000016 HC POSTOP RECOV ADDL HR: Performed by: SURGERY

## 2018-05-31 PROCEDURE — 27201423 OPTIME MED/SURG SUP & DEVICES STERILE SUPPLY: Performed by: SURGERY

## 2018-05-31 RX ORDER — ONDANSETRON 2 MG/ML
4 INJECTION INTRAMUSCULAR; INTRAVENOUS ONCE AS NEEDED
Status: DISCONTINUED | OUTPATIENT
Start: 2018-05-31 | End: 2018-05-31 | Stop reason: HOSPADM

## 2018-05-31 RX ORDER — NEOSTIGMINE METHYLSULFATE 1 MG/ML
INJECTION, SOLUTION INTRAVENOUS
Status: DISCONTINUED | OUTPATIENT
Start: 2018-05-31 | End: 2018-05-31

## 2018-05-31 RX ORDER — CEFAZOLIN SODIUM 2 G/50ML
2 SOLUTION INTRAVENOUS
Status: DISCONTINUED | OUTPATIENT
Start: 2018-05-31 | End: 2018-05-31 | Stop reason: HOSPADM

## 2018-05-31 RX ORDER — BUPIVACAINE HYDROCHLORIDE 2.5 MG/ML
INJECTION, SOLUTION EPIDURAL; INFILTRATION; INTRACAUDAL
Status: DISCONTINUED | OUTPATIENT
Start: 2018-05-31 | End: 2018-05-31 | Stop reason: HOSPADM

## 2018-05-31 RX ORDER — GLYCOPYRROLATE 0.2 MG/ML
INJECTION INTRAMUSCULAR; INTRAVENOUS
Status: DISCONTINUED | OUTPATIENT
Start: 2018-05-31 | End: 2018-05-31

## 2018-05-31 RX ORDER — OXYCODONE AND ACETAMINOPHEN 5; 325 MG/1; MG/1
1 TABLET ORAL EVERY 4 HOURS PRN
Status: DISCONTINUED | OUTPATIENT
Start: 2018-05-31 | End: 2018-05-31 | Stop reason: HOSPADM

## 2018-05-31 RX ORDER — ROCURONIUM BROMIDE 10 MG/ML
INJECTION, SOLUTION INTRAVENOUS
Status: DISCONTINUED | OUTPATIENT
Start: 2018-05-31 | End: 2018-05-31

## 2018-05-31 RX ORDER — PROPOFOL 10 MG/ML
VIAL (ML) INTRAVENOUS
Status: DISCONTINUED | OUTPATIENT
Start: 2018-05-31 | End: 2018-05-31

## 2018-05-31 RX ORDER — OXYCODONE AND ACETAMINOPHEN 5; 325 MG/1; MG/1
1 TABLET ORAL EVERY 4 HOURS PRN
Qty: 35 TABLET | Refills: 0 | Status: SHIPPED | OUTPATIENT
Start: 2018-05-31 | End: 2018-06-10

## 2018-05-31 RX ORDER — KETOROLAC TROMETHAMINE 30 MG/ML
INJECTION, SOLUTION INTRAMUSCULAR; INTRAVENOUS
Status: DISCONTINUED | OUTPATIENT
Start: 2018-05-31 | End: 2018-05-31

## 2018-05-31 RX ORDER — METOPROLOL TARTRATE 1 MG/ML
INJECTION, SOLUTION INTRAVENOUS
Status: DISCONTINUED | OUTPATIENT
Start: 2018-05-31 | End: 2018-05-31

## 2018-05-31 RX ORDER — SODIUM CHLORIDE, SODIUM LACTATE, POTASSIUM CHLORIDE, CALCIUM CHLORIDE 600; 310; 30; 20 MG/100ML; MG/100ML; MG/100ML; MG/100ML
INJECTION, SOLUTION INTRAVENOUS CONTINUOUS PRN
Status: DISCONTINUED | OUTPATIENT
Start: 2018-05-31 | End: 2018-05-31

## 2018-05-31 RX ORDER — ONDANSETRON 2 MG/ML
INJECTION INTRAMUSCULAR; INTRAVENOUS
Status: DISCONTINUED | OUTPATIENT
Start: 2018-05-31 | End: 2018-05-31

## 2018-05-31 RX ORDER — FENTANYL CITRATE 50 UG/ML
INJECTION, SOLUTION INTRAMUSCULAR; INTRAVENOUS
Status: DISCONTINUED | OUTPATIENT
Start: 2018-05-31 | End: 2018-05-31

## 2018-05-31 RX ORDER — DEXAMETHASONE SODIUM PHOSPHATE 4 MG/ML
INJECTION, SOLUTION INTRA-ARTICULAR; INTRALESIONAL; INTRAMUSCULAR; INTRAVENOUS; SOFT TISSUE
Status: DISCONTINUED | OUTPATIENT
Start: 2018-05-31 | End: 2018-05-31

## 2018-05-31 RX ORDER — METOCLOPRAMIDE HYDROCHLORIDE 5 MG/ML
INJECTION INTRAMUSCULAR; INTRAVENOUS
Status: DISCONTINUED | OUTPATIENT
Start: 2018-05-31 | End: 2018-05-31

## 2018-05-31 RX ORDER — SUCCINYLCHOLINE CHLORIDE 20 MG/ML
INJECTION INTRAMUSCULAR; INTRAVENOUS
Status: DISCONTINUED | OUTPATIENT
Start: 2018-05-31 | End: 2018-05-31

## 2018-05-31 RX ORDER — LIDOCAINE HCL/PF 100 MG/5ML
SYRINGE (ML) INTRAVENOUS
Status: DISCONTINUED | OUTPATIENT
Start: 2018-05-31 | End: 2018-05-31

## 2018-05-31 RX ORDER — SODIUM CHLORIDE 0.9 % (FLUSH) 0.9 %
3 SYRINGE (ML) INJECTION
Status: DISCONTINUED | OUTPATIENT
Start: 2018-05-31 | End: 2018-05-31 | Stop reason: HOSPADM

## 2018-05-31 RX ORDER — MIDAZOLAM HYDROCHLORIDE 1 MG/ML
INJECTION, SOLUTION INTRAMUSCULAR; INTRAVENOUS
Status: DISCONTINUED | OUTPATIENT
Start: 2018-05-31 | End: 2018-05-31

## 2018-05-31 RX ORDER — HYDROMORPHONE HYDROCHLORIDE 1 MG/ML
0.2 INJECTION, SOLUTION INTRAMUSCULAR; INTRAVENOUS; SUBCUTANEOUS EVERY 5 MIN PRN
Status: DISCONTINUED | OUTPATIENT
Start: 2018-05-31 | End: 2018-05-31 | Stop reason: HOSPADM

## 2018-05-31 RX ADMIN — GLYCOPYRROLATE 0.6 MG: 0.2 INJECTION, SOLUTION INTRAMUSCULAR; INTRAVENOUS at 08:05

## 2018-05-31 RX ADMIN — ONDANSETRON 4 MG: 2 INJECTION, SOLUTION INTRAMUSCULAR; INTRAVENOUS at 07:05

## 2018-05-31 RX ADMIN — LIDOCAINE HYDROCHLORIDE 100 MG: 20 INJECTION, SOLUTION INTRAVENOUS at 07:05

## 2018-05-31 RX ADMIN — FENTANYL CITRATE 100 MCG: 50 INJECTION INTRAMUSCULAR; INTRAVENOUS at 07:05

## 2018-05-31 RX ADMIN — BUPIVACAINE 10 ML: 13.3 INJECTION, SUSPENSION, LIPOSOMAL INFILTRATION at 08:05

## 2018-05-31 RX ADMIN — PROPOFOL 160 MG: 10 INJECTION, EMULSION INTRAVENOUS at 07:05

## 2018-05-31 RX ADMIN — NEOSTIGMINE METHYLSULFATE 5 MG: 1 INJECTION INTRAVENOUS at 08:05

## 2018-05-31 RX ADMIN — BUPIVACAINE HYDROCHLORIDE 10 ML: 2.5 INJECTION, SOLUTION EPIDURAL; INFILTRATION; INTRACAUDAL; PERINEURAL at 08:05

## 2018-05-31 RX ADMIN — GLYCOPYRROLATE 0.2 MG: 0.2 INJECTION, SOLUTION INTRAMUSCULAR; INTRAVENOUS at 07:05

## 2018-05-31 RX ADMIN — DEXAMETHASONE SODIUM PHOSPHATE 4 MG: 4 INJECTION, SOLUTION INTRAMUSCULAR; INTRAVENOUS at 07:05

## 2018-05-31 RX ADMIN — OXYCODONE HYDROCHLORIDE AND ACETAMINOPHEN 1 TABLET: 5; 325 TABLET ORAL at 10:05

## 2018-05-31 RX ADMIN — KETOROLAC TROMETHAMINE 30 MG: 30 INJECTION, SOLUTION INTRAMUSCULAR; INTRAVENOUS at 08:05

## 2018-05-31 RX ADMIN — SODIUM CHLORIDE, SODIUM LACTATE, POTASSIUM CHLORIDE, AND CALCIUM CHLORIDE: .6; .31; .03; .02 INJECTION, SOLUTION INTRAVENOUS at 06:05

## 2018-05-31 RX ADMIN — METOPROLOL TARTRATE 2 MG: 1 INJECTION, SOLUTION INTRAVENOUS at 07:05

## 2018-05-31 RX ADMIN — PROPOFOL 40 MG: 10 INJECTION, EMULSION INTRAVENOUS at 07:05

## 2018-05-31 RX ADMIN — MIDAZOLAM HYDROCHLORIDE 2 MG: 1 INJECTION, SOLUTION INTRAMUSCULAR; INTRAVENOUS at 07:05

## 2018-05-31 RX ADMIN — SODIUM CHLORIDE, SODIUM LACTATE, POTASSIUM CHLORIDE, AND CALCIUM CHLORIDE: .6; .31; .03; .02 INJECTION, SOLUTION INTRAVENOUS at 08:05

## 2018-05-31 RX ADMIN — METOPROLOL TARTRATE 1 MG: 1 INJECTION, SOLUTION INTRAVENOUS at 08:05

## 2018-05-31 RX ADMIN — CEFAZOLIN SODIUM 2 G: 2 SOLUTION INTRAVENOUS at 07:05

## 2018-05-31 RX ADMIN — METOCLOPRAMIDE 10 MG: 5 INJECTION, SOLUTION INTRAMUSCULAR; INTRAVENOUS at 07:05

## 2018-05-31 RX ADMIN — SUCCINYLCHOLINE CHLORIDE 120 MG: 20 INJECTION, SOLUTION INTRAMUSCULAR; INTRAVENOUS at 07:05

## 2018-05-31 RX ADMIN — ROCURONIUM BROMIDE 5 MG: 10 INJECTION, SOLUTION INTRAVENOUS at 07:05

## 2018-05-31 RX ADMIN — PROPOFOL 50 MG: 10 INJECTION, EMULSION INTRAVENOUS at 08:05

## 2018-05-31 RX ADMIN — ROCURONIUM BROMIDE 25 MG: 10 INJECTION, SOLUTION INTRAVENOUS at 07:05

## 2018-05-31 NOTE — BRIEF OP NOTE
[facility]  Brief Operative Note     SUMMARY     Surgery Date: 5/31/2018     Surgeon(s) and Role:     * Brent Boudreaux MD - Primary    Assisting Surgeon: None    Pre-op Diagnosis:  Calculus of gallbladder without cholecystitis without obstruction [K80.20]    Post-op Diagnosis:  Post-Op Diagnosis Codes:     * Calculus of gallbladder without cholecystitis without obstruction [K80.20]    Procedure(s) (LRB):  CHOLECYSTECTOMY-LAPAROSCOPIC (N/A)    Anesthesia: General    Description of the findings of the procedure:lap donna  Findings/Key Components: gallstones  Estimated Blood Loss: 0       Specimens:   Specimen (12h ago through future)    Start     Ordered    05/31/18 0801  Specimen to Pathology - Surgery  Once     Comments:  GALLBLADDER      05/31/18 0801          Discharge Note    SUMMARY     Admit Date: 5/31/2018    Discharge Date and Time:  05/31/2018 8:04 AM    Hospital Course  sds to home no complications  Final Diagnosis: Post-Op Diagnosis Codes:     * Calculus of gallbladder without cholecystitis without obstruction [K80.20]    Disposition: Home or Self Care    Follow Up/Patient Instructions:     Medications:  Reconciled Home Medications:      Medication List      START taking these medications    oxyCODONE-acetaminophen 5-325 mg per tablet  Commonly known as:  PERCOCET  Take 1 tablet by mouth every 4 (four) hours as needed.        CONTINUE taking these medications    cyclobenzaprine 10 MG tablet  Commonly known as:  FLEXERIL     loratadine-pseudoephedrine  mg  mg per 24 hr tablet  Commonly known as:  CLARITIN-D 24-hour  Take 1 tablet by mouth daily as needed for Allergies.     multivitamin per tablet  Commonly known as:  THERAGRAN  Take 1 tablet by mouth once daily.     omeprazole 40 MG capsule  Commonly known as:  PRILOSEC  TAKE ONE CAPSULE BY MOUTH DAILY     PEPTAMEN BARIATRIC ORAL  Take 2 capsules by mouth once daily.     VITAMIN C 500 MG tablet  Generic drug:  ascorbic  acid (vitamin C)  Take 500 mg by mouth once daily.     VYVANSE 60 MG capsule  Generic drug:  lisdexamfetamine  Take 60 mg by mouth every morning.        STOP taking these medications    ibuprofen 800 MG tablet  Commonly known as:  ULISES LERNER            Discharge Procedure Orders  Diet Adult Regular     Activity as tolerated     Shower on day dressing removed (No bath)     Notify your health care provider if you experience any of the following:  temperature >100.4     Notify your health care provider if you experience any of the following:  persistent nausea and vomiting or diarrhea     Notify your health care provider if you experience any of the following:  severe uncontrolled pain     Notify your health care provider if you experience any of the following:  redness, tenderness, or signs of infection (pain, swelling, redness, odor or green/yellow discharge around incision site)     Remove dressing in 48 hours       Follow-up Information     Brent Boudreaux MD In 1 week.    Specialty:  General Surgery  Contact information:  [address] [place] [address] [id number] [phone]

## 2018-05-31 NOTE — DISCHARGE INSTRUCTIONS
Discharge Procedure Orders    Diet Adult Regular     Activity as tolerated     Shower on day dressing removed (No bath)     Notify your health care provider if you experience any of the following:  temperature >100.4     Notify your health care provider if you experience any of the following:  persistent nausea and vomiting or diarrhea     Notify your health care provider if you experience any of the following:  severe uncontrolled pain     Notify your health care provider if you experience any of the following:  redness, tenderness, or signs of infection (pain, swelling, redness, odor or green/yellow discharge around incision site)     Remove dressing in 48 hours          Fall Prevention  Falls often occur due to slipping, tripping or losing your balance. Millions of people fall every year and injure themselves. Here are ways to reduce your risk of falling again.  · Think about your fall, was there anything that caused your fall that can be fixed, removed, or replaced?  · Make your home safe by keeping walkways clear of objects you may trip over.  · Use non-slip pads under rugs. Do not use area rugs or small throw rugs.  · Use non-slip mats in bathtubs and showers.  · Install handrails and lights on staircases.  · Do not walk in poorly lit areas.  · Do not stand on chairs or wobbly ladders.  · Use caution when reaching overhead or looking upward. This position can cause a loss of balance.  · Be sure your shoes fit properly, have non-slip bottoms and are in good condition.   · Wear shoes both inside and out. Avoid going barefoot or wearing slippers.  · Be cautious when going up and down stairs, curbs, and when walking on uneven sidewalks.  · If your balance is poor, consider using a cane or walker.  · If your fall was related to alcohol use, stop or limit alcohol intake.   · If your fall was related to use of sleeping medicines, talk to your doctor about this. You may need to reduce your dosage at bedtime if you  awaken during the night to go to the bathroom.    · To reduce the need for nighttime bathroom trips:  ¨ Avoid drinking fluids for several hours before going to bed  ¨ Empty your bladder before going to bed  ¨ Men can keep a urinal at the bedside  · Stay as active as you can. Balance, flexibility, strength, and endurance all come from exercise. They all play a role in preventing falls. Ask your healthcare provider which types of activity are right for you.  · Get your vision checked on a regular basis.  · If you have pets, know where they are before you stand up or walk so you don't trip over them.  · Use night lights.  Date Last Reviewed: 11/5/2015  © 6307-9008 [employer], "[employer]. [address] [place] [address], [place] [address]. All rights reserved. This information is not intended as a substitute for professional medical care. Always follow your healthcare professional's instructions.

## 2018-05-31 NOTE — PLAN OF CARE
Problem: Patient Care Overview  Goal: Plan of Care Review  Outcome: Outcome(s) achieved Date Met: 05/31/18 05/31/18 1035   Coping/Psychosocial   Plan Of Care Reviewed With patient;spouse       Problem: Cholecystectomy (Adult)  Goal: Signs and Symptoms of Listed Potential Problems Will be Absent, Minimized or Managed (Cholecystectomy)  Signs and symptoms of listed potential problems will be absent, minimized or managed by discharge/transition of care (reference Cholecystectomy (Adult) CPG).   Outcome: Outcome(s) achieved Date Met: 05/31/18 05/31/18 1035   Cholecystectomy   Problems Assessed (Cholecystectomy) all   Problems Present (Cholecystectomy) none     Goal: Anesthesia/Sedation Recovery  Outcome: Outcome(s) achieved Date Met: 05/31/18 05/31/18 1035   Goal/Outcome Evaluation   Anesthesia/Sedation Recovery criteria met for discharge;criteria met for transfer

## 2018-05-31 NOTE — TRANSFER OF CARE
"Anesthesia Transfer of Care Note    Patient: Ever Rubin III    Procedure(s) Performed: Procedure(s) (LRB):  CHOLECYSTECTOMY-LAPAROSCOPIC (N/A)    Patient location: PACU    Anesthesia Type: general    Transport from OR: Transported from OR on room air with adequate spontaneous ventilation    Post pain: adequate analgesia    Post assessment: no apparent anesthetic complications and tolerated procedure well    Post vital signs: stable    Level of consciousness: sedated    Nausea/Vomiting: no nausea/vomiting    Complications: none    Transfer of care protocol was followed      Last vitals:   Visit Vitals  BP (!) 150/98 (BP Location: Right arm, Patient Position: Lying)   Pulse (!) 58   Temp 36.4 °C (97.5 °F) (Axillary)   Resp 17   Ht 5' 10" (1.778 m)   Wt 84 kg (185 lb 3 oz)   SpO2 100%   BMI 26.57 kg/m²     "

## 2018-05-31 NOTE — H&P
[facility]              General Surgery Interval H&P      No changes to the H&P by Dr. Boudreaux on 05/08/18. Patient on-call to the OR for lap vs open cholecystectomy.      Ivy Garcia MD  [facility] Surgery PGY-IV

## 2018-05-31 NOTE — OP NOTE
DATE OF PROCEDURE:  05/31/2018.    PREOPERATIVE DIAGNOSES:  Chronic cholecystitis and cholelithiasis.    POSTOPERATIVE DIAGNOSES:  Chronic cholecystitis and cholelithiasis.    OPERATIVE PROCEDURE:  Laparoscopic cholecystectomy.    SURGEON:  Brent Boudreaux M.D.    ASSISTANT:  Ivy Sánchez.    PROCEDURE IN DETAIL:  After adequate premedication, the patient was taken to the   Operating Room and placed on the operating table in the supine position.    General anesthesia administered via endotracheal tube.  The abdomen was prepped   and draped in sterile fashion.  Infraumbilical incision was made.  The abdomen   was grasped and elevated.  A Veress needle inserted and the abdomen insufflated   with CO2.  A 5-mm trocar introduced.  A laparoscope was placed.  The other 3   trocars were placed in the usual position under direct vision.  Gallbladder was   grasped and elevated.  The neck of the gallbladder completely skeletonized,   identifying cystic duct and cystic artery.  Calot triangle dissected.  No   anatomic abnormalities were noted.  Cystic duct was triply clipped and divided.    Cystic artery triply clipped and divided.  Gallbladder removed from the   gallbladder fossa utilizing cautery.  Gallbladder was removed through the   epigastric trocar site.  The operative site was irrigated.  Hemostasis was   checked.  The abdomen was desufflated.  The wounds were closed with 4-0   Monocryl.  Blood loss was negligible.  The patient tolerated the procedure and   was transported to Recovery in stable condition.      CIERA  dd: 05/31/2018 08:07:05 (CDT)  td: 05/31/2018 08:50:51 (CDT)  Doc ID   #[id number]  Job ID #[id number]    CC:

## 2018-05-31 NOTE — ANESTHESIA PREPROCEDURE EVALUATION
05/31/2018  Ever Rubin III is a 42 y.o., male   Pre-operative evaluation for Procedure(s) (LRB):  CHOLECYSTECTOMY-LAPAROSCOPIC (N/A)    Ever Rubin III is a 42 y.o. male     Patient Active Problem List   Diagnosis    ADHD (attention deficit hyperactivity disorder)    Tobacco dependence    Calculus of gallbladder without cholecystitis without obstruction       Review of patient's allergies indicates:   Allergen Reactions    Penicillins Hives       No current facility-administered medications on file prior to encounter.      Current Outpatient Prescriptions on File Prior to Encounter   Medication Sig Dispense Refill    omeprazole (PRILOSEC) 40 MG capsule TAKE ONE CAPSULE BY MOUTH DAILY 30 capsule 4    VYVANSE 60 mg capsule Take 60 mg by mouth every morning.   0    cyclobenzaprine (FLEXERIL) 10 MG tablet       ibuprofen (ADVIL,MOTRIN) 800 MG tablet   0    loratadine-pseudoephedrine  mg (CLARITIN-D 24-HOUR)  mg per 24 hr tablet Take 1 tablet by mouth daily as needed for Allergies. 90 tablet 3       Past Surgical History:   Procedure Laterality Date    CATARACT EXTRACTION W/ INTRAOCULAR LENS  IMPLANT, BILATERAL      EYE SURGERY      bilateral cataract    HERNIA REPAIR  01/26/2018    SHOULDER SURGERY      SHOULDER SURGERY  2013    Rt shoulder     SLEEVE GASTROPLASTY         Social History     Social History    Marital status:      Spouse name: Jenny    Number of children: 2    Years of education: N/A     Occupational History    [employer]     Social History Main Topics    Smoking status: Current Every Day Smoker     Packs/day: 0.50    Smokeless tobacco: Never Used    Alcohol use No    Drug use: No    Sexual activity: Yes     Partners: Female     Other Topics Concern    Not on file     Social History Narrative    No narrative on file   Body mass index is 26.57  kg/m².      2D Echo:  Results for orders placed or performed during the hospital encounter of 07/25/14   2D Echo w/ Color Flow Doppler   Result Value Ref Range    EF 60     Diastolic Dysfunction No      Anesthesia Evaluation    I have reviewed the Patient Summary Reports.     I have reviewed the Medications.     Review of Systems  Anesthesia Hx:  No problems with previous Anesthesia Denies Hx of Anesthetic complications  History of prior surgery of interest to airway management or planning: Denies Family Hx of Anesthesia complications.   Denies Personal Hx of Anesthesia complications.   Social:  No Alcohol Use, Smoker    Hematology/Oncology:  Hematology Normal   Oncology Normal     EENT/Dental:EENT/Dental Normal   Cardiovascular:  Cardiovascular Normal Exercise tolerance: good     Pulmonary:  Pulmonary Normal    Renal/:  Renal/ Normal     Hepatic/GI:   GERD    Neurological:  Neurology Normal    Endocrine:  Endocrine Normal    Psych:   Psychiatric History depression ADHD         Physical Exam  General:  Well nourished    Airway/Jaw/Neck:  Airway Findings: Mouth Opening: Normal Tongue: Normal  General Airway Assessment: Adult, Average  Mallampati: II  TM Distance: Normal, at least 6 cm  Jaw/Neck Findings:  Neck ROM: Normal ROM      Dental:  Dental Findings: In tact   Chest/Lungs:  Chest/Lungs Findings: Normal Respiratory Rate, Clear to auscultation     Heart/Vascular:  Heart Findings: Rate: Normal  Rhythm: Regular Rhythm        Mental Status:  Mental Status Findings:  Alert and Oriented, Cooperative         Anesthesia Plan  Type of Anesthesia, risks & benefits discussed:  Anesthesia Type:  general  Patient's Preference:   Intra-op Monitoring Plan: standard ASA monitors  Intra-op Monitoring Plan Comments:   Post Op Pain Control Plan: multimodal analgesia and IV/PO Opioids PRN  Post Op Pain Control Plan Comments:   Induction:   IV  Beta Blocker:  Patient is not currently on a Beta-Blocker (No further documentation  required).     Beta Blocker Comments: Confirmed patient's medication list and he is not on a beta blockers  Informed Consent: Patient understands risks and agrees with Anesthesia plan.  Questions answered. Anesthesia consent signed with patient.  ASA Score: 2     Day of Surgery Review of History & Physical:    H&P update referred to the surgeon.         Ready For Surgery From Anesthesia Perspective.

## 2018-05-31 NOTE — ANESTHESIA POSTPROCEDURE EVALUATION
"Anesthesia Post Evaluation    Patient: Ever Rubin III    Procedure(s) Performed: Procedure(s) (LRB):  CHOLECYSTECTOMY-LAPAROSCOPIC (N/A)    Final Anesthesia Type: general  Patient location during evaluation: PACU  Patient participation: Yes- Able to Participate  Level of consciousness: awake and alert and oriented  Post-procedure vital signs: reviewed and stable  Pain management: adequate  Airway patency: patent  PONV status at discharge: No PONV  Anesthetic complications: no      Cardiovascular status: blood pressure returned to baseline and hemodynamically stable  Respiratory status: unassisted, spontaneous ventilation and room air  Hydration status: euvolemic  Follow-up not needed.        Visit Vitals  /74 (BP Location: Right arm, Patient Position: Sitting)   Pulse 61   Temp 37.1 °C (98.7 °F) (Oral)   Resp 15   Ht 5' 10" (1.778 m)   Wt 84 kg (185 lb 3 oz)   SpO2 99%   BMI 26.57 kg/m²       Pain/Ze Score: Pain Assessment Performed: Yes (5/31/2018  9:45 AM)  Presence of Pain: denies (5/31/2018  9:30 AM)  Pain Rating Prior to Med Admin: 2 (5/31/2018 10:09 AM)  Ze Score: 10 (5/31/2018 10:35 AM)  Modified Ze Score: 19 (5/31/2018 10:35 AM)      "

## 2018-08-13 RX ORDER — OMEPRAZOLE 40 MG/1
40 CAPSULE, DELAYED RELEASE ORAL DAILY
Qty: 30 CAPSULE | Refills: 4 | Status: SHIPPED | OUTPATIENT
Start: 2018-08-13 | End: 2019-10-22 | Stop reason: SDUPTHER

## 2018-09-14 DIAGNOSIS — J31.0 RHINITIS: ICD-10-CM

## 2018-09-14 RX ORDER — TIOCONAZOLE 6.5 %
OINTMENT WITH PREFILLED APPLICATOR VAGINAL
Qty: 90 TABLET | Refills: 0 | Status: SHIPPED | OUTPATIENT
Start: 2018-09-14 | End: 2021-12-08 | Stop reason: SDUPTHER

## 2018-09-25 ENCOUNTER — PATIENT MESSAGE (OUTPATIENT)
Dept: FAMILY MEDICINE | Facility: CLINIC | Age: 43
End: 2018-09-25

## 2018-09-26 RX ORDER — AZELASTINE HYDROCHLORIDE, FLUTICASONE PROPIONATE 137; 50 UG/1; UG/1
1 SPRAY, METERED NASAL 2 TIMES DAILY
Qty: 1 BOTTLE | Refills: 3 | Status: SHIPPED | OUTPATIENT
Start: 2018-09-26 | End: 2019-09-04 | Stop reason: CLARIF

## 2019-09-04 ENCOUNTER — OFFICE VISIT (OUTPATIENT)
Dept: ALLERGY | Facility: CLINIC | Age: 44
End: 2019-09-04
Payer: COMMERCIAL

## 2019-09-04 VITALS
DIASTOLIC BLOOD PRESSURE: 80 MMHG | SYSTOLIC BLOOD PRESSURE: 122 MMHG | BODY MASS INDEX: 25.47 KG/M2 | WEIGHT: 177.94 LBS | HEIGHT: 70 IN

## 2019-09-04 DIAGNOSIS — J45.41 MODERATE PERSISTENT ASTHMA WITH ACUTE EXACERBATION: Primary | ICD-10-CM

## 2019-09-04 DIAGNOSIS — J30.9 ALLERGIC RHINITIS, UNSPECIFIED SEASONALITY, UNSPECIFIED TRIGGER: ICD-10-CM

## 2019-09-04 PROCEDURE — 99204 PR OFFICE/OUTPT VISIT, NEW, LEVL IV, 45-59 MIN: ICD-10-PCS | Mod: S$GLB,,, | Performed by: STUDENT IN AN ORGANIZED HEALTH CARE EDUCATION/TRAINING PROGRAM

## 2019-09-04 PROCEDURE — 99999 PR PBB SHADOW E&M-EST. PATIENT-LVL III: CPT | Mod: PBBFAC,,, | Performed by: STUDENT IN AN ORGANIZED HEALTH CARE EDUCATION/TRAINING PROGRAM

## 2019-09-04 PROCEDURE — 99204 OFFICE O/P NEW MOD 45 MIN: CPT | Mod: S$GLB,,, | Performed by: STUDENT IN AN ORGANIZED HEALTH CARE EDUCATION/TRAINING PROGRAM

## 2019-09-04 PROCEDURE — 99999 PR PBB SHADOW E&M-EST. PATIENT-LVL III: ICD-10-PCS | Mod: PBBFAC,,, | Performed by: STUDENT IN AN ORGANIZED HEALTH CARE EDUCATION/TRAINING PROGRAM

## 2019-09-04 PROCEDURE — 3008F BODY MASS INDEX DOCD: CPT | Mod: CPTII,S$GLB,, | Performed by: STUDENT IN AN ORGANIZED HEALTH CARE EDUCATION/TRAINING PROGRAM

## 2019-09-04 PROCEDURE — 3008F PR BODY MASS INDEX (BMI) DOCUMENTED: ICD-10-PCS | Mod: CPTII,S$GLB,, | Performed by: STUDENT IN AN ORGANIZED HEALTH CARE EDUCATION/TRAINING PROGRAM

## 2019-09-04 RX ORDER — AZELASTINE HYDROCHLORIDE, FLUTICASONE PROPIONATE 137; 50 UG/1; UG/1
1 SPRAY, METERED NASAL 2 TIMES DAILY
Qty: 2 BOTTLE | Refills: 5 | Status: SHIPPED | OUTPATIENT
Start: 2019-09-04 | End: 2020-03-18 | Stop reason: SDUPTHER

## 2019-09-04 RX ORDER — IBUPROFEN 800 MG/1
800 TABLET ORAL 3 TIMES DAILY PRN
COMMUNITY
End: 2021-12-08 | Stop reason: SDUPTHER

## 2019-09-04 RX ORDER — PREDNISONE 10 MG/1
TABLET ORAL
Qty: 21 TABLET | Refills: 0 | Status: SHIPPED | OUTPATIENT
Start: 2019-09-04 | End: 2020-04-17

## 2019-09-04 RX ORDER — HYDROXYZINE HYDROCHLORIDE 25 MG/1
50 TABLET, FILM COATED ORAL
Refills: 5 | COMMUNITY
Start: 2019-08-03

## 2019-09-04 RX ORDER — ASPIRIN 81 MG/1
TABLET ORAL
COMMUNITY
Start: 2019-08-22 | End: 2020-04-17

## 2019-09-04 RX ORDER — NAPROXEN SODIUM 220 MG
500 TABLET ORAL
COMMUNITY
End: 2022-09-19 | Stop reason: ALTCHOICE

## 2019-09-04 RX ORDER — BUDESONIDE AND FORMOTEROL FUMARATE DIHYDRATE 160; 4.5 UG/1; UG/1
2 AEROSOL RESPIRATORY (INHALATION) 2 TIMES DAILY PRN
Qty: 1 INHALER | Refills: 3 | Status: SHIPPED | OUTPATIENT
Start: 2019-09-04 | End: 2021-12-08 | Stop reason: SDUPTHER

## 2019-09-04 RX ORDER — FLUTICASONE PROPIONATE 50 MCG
2 SPRAY, SUSPENSION (ML) NASAL 2 TIMES DAILY
Qty: 2 BOTTLE | Refills: 5 | Status: SHIPPED | OUTPATIENT
Start: 2019-09-04 | End: 2020-03-18 | Stop reason: SDUPTHER

## 2019-09-04 NOTE — PROGRESS NOTES
Allergy Clinic Note  [facility]    Subjective:      Patient ID: Ever Rubin III is a 44 y.o. male.    Chief Complaint: Wheezing; Allergies; and Cough      Referring Provider: Self, Aaareferral    History of Present Illness:  44-year-old male presents for acute care of chest and nasal symptoms.  He was seen by me prior to 2013 in my private practice.  Here is here alone, and he is a good historian.    Today he reports a approximately 6 day history of cough and wheezing associated with symptoms of a head cold manifest by malaise and scant yellow nasal discharge.  He denies any fever.  He is taking only NyQuil in Afrin without significant benefit.  He does not have access to any beta agonists at present.  He has a major tennis sternum a mint coming up and requests treatment with prednisone.    Patient has a history of asthma that is difficult to CAT occur eyes.  Symptoms are highly intermittent exacerbations can be moderate to severe and difficult to control.  He has alternate id between p.r.n. and preventive medicines in the past.  Currently he is not taking any medicines for asthma.    Patient states he ran out of Dymista.  He is use Simply Saline in the past with benefit.  He reports that previous testing by me was positive for cats, dog, and dust mites.    Interval history is significant for a blood cry at in his central retinal artery.  He is being treated with aspirin, and workup is in progress.    Additional History:   Past medical history is significant for smoking, ADHD, depression, and GERD.    Family history is significant for asthma and allergic rhinitis in his mother .  Client  reports that he has been smoking.  Currently he is smoking 1-1/2 packs per day.  This is up from his baseline of 0.5 packs per day.  He describes significant psychosocial stressors at home and has recently moved to temporary lodgings.   Exposures are notable for for indoor cats, 3 outdoor cats, and 1 indoor dog.   In his bed are 2 cats and 1 dog.  No exposure to smoke, mold, or unusual substances.  He plays tennis several times per week.      Patient Active Problem List   Diagnosis    ADHD (attention deficit hyperactivity disorder)    Tobacco dependence    Calculus of gallbladder without cholecystitis without obstruction     Current Outpatient Medications on File Prior to Visit   Medication Sig Dispense Refill    aspirin (ADULT LOW DOSE ASPIRIN) 81 MG EC tablet       cyclobenzaprine (FLEXERIL) 10 MG tablet Take 10 mg by mouth 3 (three) times daily as needed.       hydrOXYzine HCl (ATARAX) 25 MG tablet TK 1 T PO  QHS  5    ibuprofen (ADVIL,MOTRIN) 800 MG tablet Take 800 mg by mouth 3 (three) times daily as needed for Pain.      multivitamin (THERAGRAN) per tablet Take 1 tablet by mouth once daily.      naproxen sodium (ANAPROX) 220 MG tablet Take 220 mg by mouth every 12 (twelve) hours.      nut.soy,lac-red/dha/epa/FOS/in (PEPTAMEN BARIATRIC ORAL) Take 2 capsules by mouth once daily.      omeprazole (PRILOSEC) 40 MG capsule Take 1 capsule (40 mg total) by mouth once daily. (Patient taking differently: Take 20 mg by mouth once daily. ) 30 capsule 4    VYVANSE 60 mg capsule Take 60 mg by mouth every morning.   0    [DISCONTINUED] azelastine-fluticasone (DYMISTA) 137-50 mcg/spray Spry nassal spray 1 spray by Each Nare route 2 (two) times daily. (Patient taking differently: 1 spray by Each Nostril route 2 (two) times daily as needed. ) 1 Bottle 3    ascorbic acid, vitamin C, (VITAMIN C) 500 MG tablet Take 500 mg by mouth once daily.      WAL-ITIN D  mg per 24 hr tablet TAKE 1 TABLET BY MOUTH EVERY DAY AS NEEDED FOR ALLERGIES 90 tablet 0     No current facility-administered medications on file prior to visit.          Review of Systems   Constitutional: Negative for chills and fever.   HENT: Negative for ear discharge and nosebleeds.    Eyes: Negative for discharge and redness.   Respiratory: Positive for  "cough, shortness of breath and wheezing. Negative for hemoptysis, sputum production and stridor.    Cardiovascular: Positive for chest pain. Negative for palpitations.   Gastrointestinal: Negative for blood in stool, melena and vomiting.   Genitourinary: Negative for dysuria and hematuria.   Skin: Negative for itching and rash.   Neurological: Negative for seizures and loss of consciousness.       Objective:   /80   Ht 5' 10" (1.778 m)   Wt 80.7 kg (177 lb 14.6 oz)   BMI 25.53 kg/m²       Physical Exam   Constitutional: He is oriented to person, place, and time and well-developed, well-nourished, and in no distress. No distress.   HENT:   Head: Normocephalic and atraumatic.   Right Ear: Tympanic membrane normal.   Left Ear: Tympanic membrane normal.   Nose: Nose normal. No nasal deformity. No epistaxis.  No foreign bodies.   Mouth/Throat: No uvula swelling. No oropharyngeal exudate or tonsillar abscesses.   Eyes: Conjunctivae are normal. Right eye exhibits no discharge. Left eye exhibits no discharge.   Neck: Neck supple.   Cardiovascular: Normal rate, regular rhythm, normal heart sounds and intact distal pulses.   Pulmonary/Chest: Effort normal and breath sounds normal. No stridor. No respiratory distress. He has no wheezes. He has no rales.   Abdominal: Soft. He exhibits no distension. There is no tenderness.   Musculoskeletal: He exhibits no edema or deformity.   Lymphadenopathy:     He has no cervical adenopathy.   Neurological: He is alert and oriented to person, place, and time. Gait normal.   Skin: Skin is warm and dry. No rash noted. No erythema.   Psychiatric: Memory, affect and judgment normal.       Data:  Peak flow 370  Eo count 400 on CBC from 05/16/2018    Assessment:     1. Moderate persistent asthma with acute exacerbation    2. Allergic rhinitis, unspecified seasonality, unspecified trigger        Plan:     Medical decision making:  Patient is presenting with an uncontrolled asthma " exacerbation on no medication.  Will resume treatment with Symbicort as a combination rescue/controller medication.  Will also treat with a brief course of systemic steroids.  Ever was seen today for wheezing, allergies and cough.    Diagnoses and all orders for this visit:    Moderate persistent asthma with acute exacerbation  -     predniSONE (DELTASONE) 10 MG tablet; Take 6 tabs on day 1 Take 5 tabs on day 2 Take 4 tabs on day 3 Take 3 tabs on day 4 Take 2 tabs on day 5 Take 1 tab on day 6 Stop  -     budesonide-formoterol 160-4.5 mcg (SYMBICORT) 160-4.5 mcg/actuation HFAA; Inhale 2 puffs into the lungs 2 (two) times daily as needed. See written instructions.      Allergic rhinitis, unspecified seasonality, unspecified trigger  -     azelastine-fluticasone (DYMISTA) 137-50 mcg/spray Spry nassal spray; 1 spray by Each Nostril route 2 (two) times daily.  -     fluticasone propionate (FLONASE) 50 mcg/actuation nasal spray; 2 sprays (100 mcg total) by Each Nostril route 2 (two) times daily.        Patient Instructions   Asthma    Prednisone burst using 10mg tablets:  6 tablets on day 1  5 tablets on day 2  4 tablets on day 3  3 tablets on day 4  2 tablets on day 5  1 tablet on day 6  STOP    Symbicort red inhaler:  2 puffs as needed up to every 12 hours.        Allergies    Flonase (= fluticasone) nasal spray:  2 squirts each nostril twice a day   Remember to aim out toward your ear.   Needs to be used regularly 5-14 days for full effect.    Astelin = azelastine nasal spray:    2 squirts each nostril as needed, up to twice a day   Do not snort (because it burns and tastes bad)      Simply Saline spray whenever your nose feels full or gunky.            Follow up in about 6 months (around 3/4/2020).    Vilma Lord MD

## 2019-09-04 NOTE — PATIENT INSTRUCTIONS
Asthma    Prednisone burst using 10mg tablets:  6 tablets on day 1  5 tablets on day 2  4 tablets on day 3  3 tablets on day 4  2 tablets on day 5  1 tablet on day 6  STOP    Symbicort red inhaler:  2 puffs as needed up to every 12 hours.        Allergies    Flonase (= fluticasone) nasal spray:  2 squirts each nostril twice a day   Remember to aim out toward your ear.   Needs to be used regularly 5-14 days for full effect.    Astelin = azelastine nasal spray:    2 squirts each nostril as needed, up to twice a day   Do not snort (because it burns and tastes bad)      Simply Saline spray whenever your nose feels full or gunky.

## 2019-09-17 ENCOUNTER — OFFICE VISIT (OUTPATIENT)
Dept: PSYCHIATRY | Facility: CLINIC | Age: 44
End: 2019-09-17
Payer: COMMERCIAL

## 2019-09-17 DIAGNOSIS — Z63.5 MARITAL CONFLICT INVOLVING DIVORCE: ICD-10-CM

## 2019-09-17 DIAGNOSIS — F43.20 ADJUSTMENT DISORDER, UNSPECIFIED TYPE: Primary | ICD-10-CM

## 2019-09-17 PROCEDURE — 90791 PR PSYCHIATRIC DIAGNOSTIC EVALUATION: ICD-10-PCS | Mod: S$GLB,,, | Performed by: SOCIAL WORKER

## 2019-09-17 PROCEDURE — 90791 PSYCH DIAGNOSTIC EVALUATION: CPT | Mod: S$GLB,,, | Performed by: SOCIAL WORKER

## 2019-09-17 SDOH — SOCIAL DETERMINANTS OF HEALTH (SDOH): DISRUPTION OF FAMILY BY SEPARATION AND DIVORCE: Z63.5
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
## 2019-10-17 ENCOUNTER — OCCUPATIONAL HEALTH (OUTPATIENT)
Dept: URGENT CARE | Facility: CLINIC | Age: 44
End: 2019-10-17

## 2019-10-17 PROCEDURE — 90686 IIV4 VACC NO PRSV 0.5 ML IM: CPT | Mod: S$GLB,,, | Performed by: PREVENTIVE MEDICINE

## 2019-10-17 PROCEDURE — 90686 FLU VACCINE (QUAD) GREATER THAN OR EQUAL TO 3YO PRESERVATIVE FREE IM: ICD-10-PCS | Mod: S$GLB,,, | Performed by: PREVENTIVE MEDICINE

## 2019-10-22 ENCOUNTER — PATIENT MESSAGE (OUTPATIENT)
Dept: ALLERGY | Facility: CLINIC | Age: 44
End: 2019-10-22

## 2019-10-23 RX ORDER — OMEPRAZOLE 40 MG/1
40 CAPSULE, DELAYED RELEASE ORAL DAILY
Qty: 90 CAPSULE | Refills: 0 | Status: SHIPPED | OUTPATIENT
Start: 2019-10-23 | End: 2020-03-18 | Stop reason: SDUPTHER

## 2019-10-23 RX ORDER — OMEPRAZOLE 40 MG/1
40 CAPSULE, DELAYED RELEASE ORAL DAILY
Qty: 30 CAPSULE | Refills: 4 | Status: SHIPPED | OUTPATIENT
Start: 2019-10-23 | End: 2019-10-23 | Stop reason: SDUPTHER

## 2019-10-31 ENCOUNTER — OFFICE VISIT (OUTPATIENT)
Dept: PSYCHIATRY | Facility: CLINIC | Age: 44
End: 2019-10-31
Payer: COMMERCIAL

## 2019-10-31 DIAGNOSIS — F43.20 ADJUSTMENT DISORDER, UNSPECIFIED TYPE: Primary | ICD-10-CM

## 2019-10-31 DIAGNOSIS — Z63.5 MARITAL CONFLICT INVOLVING DIVORCE: ICD-10-CM

## 2019-10-31 PROCEDURE — 90834 PSYTX W PT 45 MINUTES: CPT | Mod: S$GLB,,, | Performed by: SOCIAL WORKER

## 2019-10-31 PROCEDURE — 90834 PR PSYCHOTHERAPY W/PATIENT, 45 MIN: ICD-10-PCS | Mod: S$GLB,,, | Performed by: SOCIAL WORKER

## 2019-10-31 SDOH — SOCIAL DETERMINANTS OF HEALTH (SDOH): DISRUPTION OF FAMILY BY SEPARATION AND DIVORCE: Z63.5

## 2019-10-31 NOTE — PROGRESS NOTES
"Individual Psychotherapy (PhD/LCSW)    10/31/2019    Site:  [facility]         Therapeutic Intervention: Met with patient.  Outpatient - Insight oriented psychotherapy 45 min - CPT code 69113 and Outpatient - Supportive psychotherapy 45 min - CPT Code 97534    Chief complaint/reason for encounter: depression and interpersonal     Interval history and content of current session: Patient returned to clinic for follow up psychotherapy. Patient states that the last couple weeks have been rough. States that after last session patient and wife discussed that she continues to need space. Was offering to pay for wife to go on vacation but she refused. States that he then said that he would go on vacation. Planned a tennis trip but a couple days before tore a tendon in his finger and had to cancel the trip. Since that time reports that wife has been taking care of patient and going out of her way to help him. States that he wasn't able to bathe himself fully the first couple of days and she got in the shower and helped him. Also reports that within the last four days wife has started to wear her wedding ring more for no apparent reason. States that his behavior toward wife has not change. He continues to do work that she pointed out to him originally. States that he would hug her and kiss her kerry and the other night she came to him and gave him a kiss. Will not say "I love you". Reports that any time he brings up their situation it doesn't end well and she gets defensive. States that she had retained an  that todd up paperwork but she didn't like it so she never filed. Patient retained his own  and gave her the paperwork to look over before he filed it and she went off on him. Reports that he continues to struggle with understanding what she wants and what he can do to make things better. After surgery activity level dropped and patient started to get depressed. Reports that he " went to his psychiatrist and was started on Wellbutrin. Reports that he is feeling better since starting medication. Discussed time limit for situation. Patient is not comfortable with saying that he has a time limit for situation. States that he still loves his wife and wants to be . Wife has made it clear that she doesn't love patient anymore but he thinks that some of her attitude toward him is changing. Discussed moving forward.     Treatment plan:  · Target symptoms: depression, adjustment, marital stress  · Why chosen therapy is appropriate versus another modality: relevant to diagnosis, evidence based practice  · Outcome monitoring methods: self-report, observation  · Therapeutic intervention type: insight oriented psychotherapy, supportive psychotherapy    Risk parameters:  Patient reports no suicidal ideation  Patient reports no homicidal ideation  Patient reports no self-injurious behavior  Patient reports no violent behavior    Verbal deficits: None    Patient's response to intervention:  The patient's response to intervention is accepting.    Progress toward goals and other mental status changes:  The patient's progress toward goals is fair .    Diagnosis:     ICD-10-CM ICD-9-CM   1. Adjustment disorder, unspecified type F43.20 309.9   2. Marital conflict involving divorce Z63.5 V61.03       Plan:  individual psychotherapy and medication management by physician    Return to clinic: 2 weeks, 1 month    Length of Service (minutes): 45     Cassandra Rockweiler, LCSW-CLARA

## 2019-12-03 ENCOUNTER — OFFICE VISIT (OUTPATIENT)
Dept: PSYCHIATRY | Facility: CLINIC | Age: 44
End: 2019-12-03
Payer: COMMERCIAL

## 2019-12-03 DIAGNOSIS — F43.20 ADJUSTMENT DISORDER, UNSPECIFIED TYPE: Primary | ICD-10-CM

## 2019-12-03 DIAGNOSIS — Z63.5 MARITAL CONFLICT INVOLVING DIVORCE: ICD-10-CM

## 2019-12-03 PROCEDURE — 90834 PSYTX W PT 45 MINUTES: CPT | Mod: S$GLB,,, | Performed by: SOCIAL WORKER

## 2019-12-03 PROCEDURE — 90834 PR PSYCHOTHERAPY W/PATIENT, 45 MIN: ICD-10-PCS | Mod: S$GLB,,, | Performed by: SOCIAL WORKER

## 2019-12-03 SDOH — SOCIAL DETERMINANTS OF HEALTH (SDOH): DISRUPTION OF FAMILY BY SEPARATION AND DIVORCE: Z63.5

## 2019-12-03 NOTE — PROGRESS NOTES
"Individual Psychotherapy (PhD/LCSW)    12/3/2019    Site:  [facility]         Therapeutic Intervention: Met with patient.  Outpatient - Insight oriented psychotherapy 45 min - CPT code 02907 and Outpatient - Supportive psychotherapy 45 min - CPT Code 09806    Chief complaint/reason for encounter: depression and interpersonal     Interval history and content of current session: Patient returned to clinic for follow up psychotherapy. Patient states that he is doing about the same as last session. States that marriage continues to be going the same direction. Has noticed some progress. Feels like wife at times is more affectionate with him. Also continues to wear her rings. States that he hasn't been asking as much about relationship or divorce. States that he tries to continue doing things that wife said were some of her big issues. States that he is also busy at work so he doesn't have a lot of time to spend thinking about relationship. Reports that the two will fuss about small things like taking out the trash or doing the dishes. Had pin from finger removed before Thanksgiving, is also back playing tennis. Patient thinks that this is also helpful because he isn't "up wife's butt" as much and so the time they do spend together is quality time. Discussed that he has made a plan to decided on whether the relationship is going to move forward or end. States that wedding anniversary is January 16th. Going to use this as a time to see if wife wants to work on marriage or if they start divorce proceedings. Discussed working on being open with wife about counseling so that they can get better.     Treatment plan:  · Target symptoms: depression, adjustment, marital stress  · Why chosen therapy is appropriate versus another modality: relevant to diagnosis, evidence based practice  · Outcome monitoring methods: self-report, observation  · Therapeutic intervention type: insight oriented psychotherapy, " supportive psychotherapy    Risk parameters:  Patient reports no suicidal ideation  Patient reports no homicidal ideation  Patient reports no self-injurious behavior  Patient reports no violent behavior    Verbal deficits: None    Patient's response to intervention:  The patient's response to intervention is accepting.    Progress toward goals and other mental status changes:  The patient's progress toward goals is fair .    Diagnosis:     ICD-10-CM ICD-9-CM   1. Adjustment disorder, unspecified type F43.20 309.9   2. Marital conflict involving divorce Z63.5 V61.03       Plan:  individual psychotherapy and medication management by physician    Return to clinic: 2 weeks, 1 month    Length of Service (minutes): 45     Cassandra Rockweiler, [facility]-BACS

## 2019-12-30 ENCOUNTER — OFFICE VISIT (OUTPATIENT)
Dept: PSYCHIATRY | Facility: CLINIC | Age: 44
End: 2019-12-30
Payer: COMMERCIAL

## 2019-12-30 DIAGNOSIS — Z63.5 MARITAL CONFLICT INVOLVING DIVORCE: ICD-10-CM

## 2019-12-30 DIAGNOSIS — F43.20 ADJUSTMENT DISORDER, UNSPECIFIED TYPE: Primary | ICD-10-CM

## 2019-12-30 PROCEDURE — 90834 PSYTX W PT 45 MINUTES: CPT | Mod: S$GLB,,, | Performed by: SOCIAL WORKER

## 2019-12-30 PROCEDURE — 90834 PR PSYCHOTHERAPY W/PATIENT, 45 MIN: ICD-10-PCS | Mod: S$GLB,,, | Performed by: SOCIAL WORKER

## 2019-12-30 SDOH — SOCIAL DETERMINANTS OF HEALTH (SDOH): DISRUPTION OF FAMILY BY SEPARATION AND DIVORCE: Z63.5

## 2019-12-30 NOTE — PROGRESS NOTES
"Individual Psychotherapy (PhD/LCSW)    12/30/2019    Site:  [facility]         Therapeutic Intervention: Met with patient.  Outpatient - Insight oriented psychotherapy 45 min - CPT code 99881 and Outpatient - Supportive psychotherapy 45 min - CPT Code 31831    Chief complaint/reason for encounter: depression and interpersonal     Interval history and content of current session: Patient returned to clinic for follow up psychotherapy. Patient states that he is doing "okay". States that not much has changed since last visit. States that he has been off of work the last week. Work has been busy the last couple of weeks so it is good to have some time off. States that he is still having the same relationship with wife. Did find out that wife blames him for son's anger problems. Reports that in the past he has suggested that son go to therapy but wife never pushes son to do anything. States that he feels like relationship goes back and forth. Feels tired of being "neglected, rejected, and left out all the time". Reports that he asks his wife all the time to go to dinner or lunch and she always refuses. States that the other day he got an alert about her going out to lunch with son. This was very upsetting because it just makes him feel that she doesn't want to try with patient. Feels like he is the last priority. Has told wife how he feels but she doesn't change behavior. Asked patient again about how long he thinks that he can live like this. States that he doesn't think he could leave because he doesn't want to be alone. States that he doesn't have many friends. He is an only child and doesn't spend much time with parents. States that if he leaves wife he feels like he will have no one. Discussed being lonely/alone and being miserable and how he has to choose. Discussed that he feels depressed but doesn't change behavior or environment to help him. Continues to have limited insight into " relationship.     Treatment plan:  · Target symptoms: depression, adjustment, marital stress  · Why chosen therapy is appropriate versus another modality: relevant to diagnosis, evidence based practice  · Outcome monitoring methods: self-report, observation  · Therapeutic intervention type: insight oriented psychotherapy, supportive psychotherapy    Risk parameters:  Patient reports no suicidal ideation  Patient reports no homicidal ideation  Patient reports no self-injurious behavior  Patient reports no violent behavior    Verbal deficits: None    Patient's response to intervention:  The patient's response to intervention is accepting.    Progress toward goals and other mental status changes:  The patient's progress toward goals is fair .    Diagnosis:     ICD-10-CM ICD-9-CM   1. Adjustment disorder, unspecified type F43.20 309.9   2. Marital conflict involving divorce Z63.5 V61.03       Plan:  individual psychotherapy and medication management by physician    Return to clinic: 2 weeks, 1 month    Length of Service (minutes): 45     Cassandra Rockweiler, LCSW-BACS

## 2020-01-20 ENCOUNTER — OFFICE VISIT (OUTPATIENT)
Dept: PSYCHIATRY | Facility: CLINIC | Age: 45
End: 2020-01-20
Payer: COMMERCIAL

## 2020-01-20 DIAGNOSIS — Z63.5 MARITAL CONFLICT INVOLVING DIVORCE: ICD-10-CM

## 2020-01-20 DIAGNOSIS — F43.20 ADJUSTMENT DISORDER, UNSPECIFIED TYPE: Primary | ICD-10-CM

## 2020-01-20 PROCEDURE — 90834 PSYTX W PT 45 MINUTES: CPT | Mod: S$GLB,,, | Performed by: SOCIAL WORKER

## 2020-01-20 PROCEDURE — 90834 PR PSYCHOTHERAPY W/PATIENT, 45 MIN: ICD-10-PCS | Mod: S$GLB,,, | Performed by: SOCIAL WORKER

## 2020-01-20 SDOH — SOCIAL DETERMINANTS OF HEALTH (SDOH): DISRUPTION OF FAMILY BY SEPARATION AND DIVORCE: Z63.5

## 2020-01-20 NOTE — PROGRESS NOTES
"Individual Psychotherapy (PhD/LCSW)    1/20/2020    Site:  [facility]         Therapeutic Intervention: Met with patient.  Outpatient - Insight oriented psychotherapy 45 min - CPT code 03845 and Outpatient - Supportive psychotherapy 45 min - CPT Code 67013    Chief complaint/reason for encounter: depression and interpersonal     Interval history and content of current session: Patient returned to clinic for follow up psychotherapy. Patient states that he is doing "okay". Reports that he has decided that he is going to move out of the house. Last Thursday was their 21st anniversary. Patient left wife a card and later text her, didn't get a response. When wife got home he said something to her about it and she just blew him off. Believes that wife is being motivated to leave patient by son. States that every time they get into a disagreement the "incident" comes up. Incident happened a year and a half ago. Wife and son got into it about homework and she left. Patient later told son that "if I lose my wife I will fuck your life up". States that son took this as a threat. Get frustrated at wife because she always says she doesn't have time to talk to him but has time to talk to the work frandy friend for 20+ minutes multiple times a day. Has decided that he is no longer going to take the medication. States that if other people in his life don't have to change their behavior why does he have to alter his mood. Moving out has been difficult because he doesn't like to "fail". States that getting a divorce means failure to him and that is why he is trying so hard. Discussed taking a break for a week and then deciding where he goes from there.     Treatment plan:  · Target symptoms: depression, adjustment, marital stress  · Why chosen therapy is appropriate versus another modality: relevant to diagnosis, evidence based practice  · Outcome monitoring methods: self-report, observation  · Therapeutic " intervention type: insight oriented psychotherapy, supportive psychotherapy    Risk parameters:  Patient reports no suicidal ideation  Patient reports no homicidal ideation  Patient reports no self-injurious behavior  Patient reports no violent behavior    Verbal deficits: None    Patient's response to intervention:  The patient's response to intervention is accepting.    Progress toward goals and other mental status changes:  The patient's progress toward goals is fair .    Diagnosis:     ICD-10-CM ICD-9-CM   1. Adjustment disorder, unspecified type F43.20 309.9   2. Marital conflict involving divorce Z63.5 V61.03       Plan:  individual psychotherapy and medication management by physician    Return to clinic: 2 weeks, 1 month    Length of Service (minutes): 45     Cassandra Rockweiler, LCS-BACS

## 2020-02-26 ENCOUNTER — PATIENT MESSAGE (OUTPATIENT)
Dept: ALLERGY | Facility: CLINIC | Age: 45
End: 2020-02-26

## 2020-03-18 DIAGNOSIS — J45.41 MODERATE PERSISTENT ASTHMA WITH ACUTE EXACERBATION: ICD-10-CM

## 2020-03-18 DIAGNOSIS — J30.9 ALLERGIC RHINITIS, UNSPECIFIED SEASONALITY, UNSPECIFIED TRIGGER: ICD-10-CM

## 2020-03-18 DIAGNOSIS — K21.9 GASTROESOPHAGEAL REFLUX DISEASE, ESOPHAGITIS PRESENCE NOT SPECIFIED: Primary | ICD-10-CM

## 2020-03-18 RX ORDER — AZELASTINE 1 MG/ML
2 SPRAY, METERED NASAL 2 TIMES DAILY
Qty: 30 ML | Refills: 1 | Status: SHIPPED | OUTPATIENT
Start: 2020-03-18 | End: 2020-06-06

## 2020-03-18 RX ORDER — OMEPRAZOLE 40 MG/1
40 CAPSULE, DELAYED RELEASE ORAL DAILY
Qty: 90 CAPSULE | Refills: 0 | Status: SHIPPED | OUTPATIENT
Start: 2020-03-18 | End: 2020-09-25

## 2020-03-18 RX ORDER — FLUTICASONE PROPIONATE 50 MCG
2 SPRAY, SUSPENSION (ML) NASAL 2 TIMES DAILY
Qty: 32 G | Refills: 1 | Status: SHIPPED | OUTPATIENT
Start: 2020-03-18 | End: 2020-06-05

## 2020-03-18 NOTE — TELEPHONE ENCOUNTER
Patient requesting refills of the following medications since Dr. Lord will not be available until mid April: Omeprazole, Flonase, and Astelin.

## 2020-04-16 NOTE — PROGRESS NOTES
Allergy Clinic Note  [facility]    Subjective:      Patient ID: Ever Rubin III is a 44 y.o. male.    Chief Complaint: Follow-up      Allergy problem list:     Asthma, moderate persistent --   Smoking  GERD  Cat and dog exposure  Allergic rhinitis:  Cat, dog, mite (per pt)  (ADHD)    History of Present Illness:  44-year-old male with asthma and rhinitis is here for scheduled follow up of asthma.    Related medications  Symbicort prn  Astelin  2 sprays BID  Flonase 1-2 sprays BID  Omeprazole  hydroxizine HS  loratidine prn -- not using    At last visit he presented with an acute exacerbation of his asthma.  He reports that symptoms resolved on day 2 of prednisone.  He denies any chest symptoms since then.  He has not needed to use is Symbicort, despite playing tennis 4 times a week    States heartburn is controlled on omeprazole.    States rhinitis is controlled on combination of Flonase and Astelin.  This is despite  ongoing cat and dog exposure.  He thinks he might have a dust proof pillow cover but not a mattress cover.    He is concerned that he is developing diabetes.  This is based on the amount of sugar he has been eating.    Patient has a history of asthma that is difficult to categorize.  Symptoms are highly intermittent; however, exacerbations can be moderate to severe and difficult to control.  He has alternated between p.r.n. and preventive medicines in the past.  Currently he is not taking any medicines for asthma.    Patient states he ran out of Dymista.  He has used Simply Saline in the past with benefit.  He reports that previous testing by me was positive for cats, dog, and dust mites.    Interval history is significant for a blood clot in his central retinal artery.  He is being treated with aspirin, and workup is in progress.    Additional History:   Interval history is unremarkable.  Past medical history is significant for smoking, ADHD, depression, and GERD.    Family history is  significant for asthma and allergic rhinitis in his mother .  Client  reports that he has been smoking.  Currently he is smoking 1-1/2 packs per day.  This is up from his baseline of 0.5 packs per day.  He describes significant psychosocial stressors at home and has recently moved to temporary lodgings.   Exposures are notable for for indoor cats, 3 outdoor cats, and 1 indoor dog.  In his bed are 2 cats and 1 dog.  No exposure to smoke, mold, or unusual substances.  He plays tennis several times per week.      Patient Active Problem List   Diagnosis    ADHD (attention deficit hyperactivity disorder)    Tobacco dependence    Calculus of gallbladder without cholecystitis without obstruction     Current Outpatient Medications on File Prior to Visit   Medication Sig Dispense Refill    ascorbic acid, vitamin C, (VITAMIN C) 500 MG tablet Take 500 mg by mouth once daily.      azelastine (ASTELIN) 137 mcg (0.1 %) nasal spray 2 sprays (274 mcg total) by Nasal route 2 (two) times daily. 30 mL 1    cyclobenzaprine (FLEXERIL) 10 MG tablet Take 10 mg by mouth 3 (three) times daily as needed.       fluticasone propionate (FLONASE) 50 mcg/actuation nasal spray 2 sprays (100 mcg total) by Each Nostril route 2 (two) times daily. 32 g 1    hydrOXYzine HCl (ATARAX) 25 MG tablet TK 1 T PO  QHS  5    ibuprofen (ADVIL,MOTRIN) 800 MG tablet Take 800 mg by mouth 3 (three) times daily as needed for Pain.      multivitamin (THERAGRAN) per tablet Take 1 tablet by mouth once daily.      omeprazole (PRILOSEC) 40 MG capsule Take 1 capsule (40 mg total) by mouth once daily. 90 capsule 0    VYVANSE 60 mg capsule Take 60 mg by mouth every morning.   0    budesonide-formoterol 160-4.5 mcg (SYMBICORT) 160-4.5 mcg/actuation HFAA Inhale 2 puffs into the lungs 2 (two) times daily as needed. See written instructions. (Patient not taking: Reported on 4/17/2020) 1 Inhaler 3    naproxen sodium (ANAPROX) 220 MG tablet Take 220 mg by mouth every  "12 (twelve) hours.      nut.soy,lac-red/dha/epa/FOS/in (PEPTAMEN BARIATRIC ORAL) Take 2 capsules by mouth once daily.      WAL-ITIN D  mg per 24 hr tablet TAKE 1 TABLET BY MOUTH EVERY DAY AS NEEDED FOR ALLERGIES (Patient not taking: Reported on 4/17/2020) 90 tablet 0    [DISCONTINUED] aspirin (ADULT LOW DOSE ASPIRIN) 81 MG EC tablet       [DISCONTINUED] predniSONE (DELTASONE) 10 MG tablet Take 6 tabs on day 1 Take 5 tabs on day 2 Take 4 tabs on day 3 Take 3 tabs on day 4 Take 2 tabs on day 5 Take 1 tab on day 6 Stop (Patient not taking: Reported on 4/17/2020) 21 tablet 0     No current facility-administered medications on file prior to visit.          Review of Systems   Constitutional: Negative for chills and fever.   HENT: Negative for congestion, ear discharge and nosebleeds.    Eyes: Negative for discharge and redness.   Respiratory: Negative for cough, hemoptysis, sputum production, shortness of breath, wheezing and stridor.    Cardiovascular: Negative for chest pain and palpitations.   Gastrointestinal: Negative for blood in stool, melena and vomiting.   Genitourinary: Negative for dysuria and hematuria.   Skin: Negative for itching and rash.   Neurological: Negative for seizures and loss of consciousness.   Endo/Heme/Allergies: Positive for environmental allergies. Does not bruise/bleed easily.       Objective:   Ht 5' 10" (1.778 m)   Wt 86.2 kg (190 lb)   BMI 27.26 kg/m²       Physical Exam   Constitutional: He is well-developed, well-nourished, and in no distress.   HENT:   Head: Normocephalic and atraumatic.   Nose: Nose normal.   Eyes: Conjunctivae are normal. Right eye exhibits no discharge. Left eye exhibits no discharge.   Neck: Neck supple.   Pulmonary/Chest: Effort normal. No stridor. No respiratory distress.   Abdominal: He exhibits no distension.   Musculoskeletal: He exhibits no deformity.   Neurological: He is alert. GCS score is 15.   Skin: No rash noted. No erythema.   Psychiatric: " Memory and affect normal.       Data:  Last visit Peak flow 370  Eo count 400 on CBC from 05/16/2018    Assessment:     1. Healthcare maintenance        Plan:     Medical decision making:  Patient has rhinitis is adequately controlled.  He has had no further bronchospasm.  I recommend he continue his current medicines.  At his request, I am ordering routine labs including fasting glucose, lipids and hemoglobin A1c.    Diagnoses and all orders for this visit:          Dust allergy  Continue Astelin and Flonase  Reviewed dust avoidance measures    Mild intermittent asthma, infection trigger  Continue Symbicort prn    Patient Instructions   Testing  Blood work for basic health maintenance Thursday at [facility]       Check [facility] in one week for results or call [phone]       Contact me with questions or concerns       I will contact you if anything needs immediate attention.        Treatment    Continue same        Return 1 year      Follow up in about 1 year (around 4/17/2021).    Vilma Lord MD

## 2020-04-17 ENCOUNTER — OFFICE VISIT (OUTPATIENT)
Dept: ALLERGY | Facility: CLINIC | Age: 45
End: 2020-04-17
Payer: COMMERCIAL

## 2020-04-17 VITALS — BODY MASS INDEX: 27.2 KG/M2 | HEIGHT: 70 IN | WEIGHT: 190 LBS

## 2020-04-17 DIAGNOSIS — Z00.00 HEALTHCARE MAINTENANCE: Primary | ICD-10-CM

## 2020-04-17 PROCEDURE — 99213 PR OFFICE/OUTPT VISIT, EST, LEVL III, 20-29 MIN: ICD-10-PCS | Mod: 95,,, | Performed by: STUDENT IN AN ORGANIZED HEALTH CARE EDUCATION/TRAINING PROGRAM

## 2020-04-17 PROCEDURE — 3008F BODY MASS INDEX DOCD: CPT | Mod: CPTII,,, | Performed by: STUDENT IN AN ORGANIZED HEALTH CARE EDUCATION/TRAINING PROGRAM

## 2020-04-17 PROCEDURE — 3008F PR BODY MASS INDEX (BMI) DOCUMENTED: ICD-10-PCS | Mod: CPTII,,, | Performed by: STUDENT IN AN ORGANIZED HEALTH CARE EDUCATION/TRAINING PROGRAM

## 2020-04-17 PROCEDURE — 99213 OFFICE O/P EST LOW 20 MIN: CPT | Mod: 95,,, | Performed by: STUDENT IN AN ORGANIZED HEALTH CARE EDUCATION/TRAINING PROGRAM

## 2020-04-17 NOTE — PATIENT INSTRUCTIONS
Testing  Blood work for basic health maintenance Thursday at [facility]       Check [facility] in one week for results or call [phone]       Contact me with questions or concerns       I will contact you if anything needs immediate attention.        Treatment    Continue same        Return 1 year

## 2020-04-22 ENCOUNTER — OFFICE VISIT (OUTPATIENT)
Dept: PSYCHIATRY | Facility: CLINIC | Age: 45
End: 2020-04-22
Payer: COMMERCIAL

## 2020-04-22 DIAGNOSIS — Z63.5 MARITAL CONFLICT INVOLVING DIVORCE: ICD-10-CM

## 2020-04-22 DIAGNOSIS — F43.20 ADJUSTMENT DISORDER, UNSPECIFIED TYPE: Primary | ICD-10-CM

## 2020-04-22 PROCEDURE — 90832 PR PSYCHOTHERAPY W/PATIENT, 30 MIN: ICD-10-PCS | Mod: 95,,, | Performed by: SOCIAL WORKER

## 2020-04-22 PROCEDURE — 90832 PSYTX W PT 30 MINUTES: CPT | Mod: 95,,, | Performed by: SOCIAL WORKER

## 2020-04-22 SDOH — SOCIAL DETERMINANTS OF HEALTH (SDOH): DISRUPTION OF FAMILY BY SEPARATION AND DIVORCE: Z63.5

## 2020-04-22 NOTE — PROGRESS NOTES
"The patient location is: home  The chief complaint leading to consultation is: anxiety, marital conflict  Visit type: audiovisual  Total time spent with patient: 26 minutes  Each patient to whom he or she provides medical services by telemedicine is:  (1) informed of the relationship between the physician and patient and the respective role of any other health care provider with respect to management of the patient; and (2) notified that he or she may decline to receive medical services by telemedicine and may withdraw from such care at any time.    Notes: Patient returned for psychotherapy. Reports that things are going okay. At the beginning at the COVID pandemic he moved back home. This was due to being with his parents and not wanting to put them at risk. Reports that it has been about 3-4 weeks since returning home. Reports that things are going well with wife. States that they are working together well. Some problems with son but nothing more than normal. States that marital problems are not bothersome right now. States that the biggest problem is that he is mentally exhausted from work. Believes that changes with wife are "real" and is hopeful about the future. Reports that he is trying to take care of mental health by playing tennis a couple days a week.     Encounter Diagnoses   Name Primary?    Adjustment disorder, unspecified type Yes    Marital conflict involving divorce        "

## 2020-04-28 ENCOUNTER — LAB VISIT (OUTPATIENT)
Dept: LAB | Facility: HOSPITAL | Age: 45
End: 2020-04-28
Attending: STUDENT IN AN ORGANIZED HEALTH CARE EDUCATION/TRAINING PROGRAM
Payer: COMMERCIAL

## 2020-04-28 DIAGNOSIS — Z00.00 HEALTHCARE MAINTENANCE: ICD-10-CM

## 2020-04-28 LAB
ALBUMIN SERPL BCP-MCNC: 4 G/DL (ref 3.5–5.2)
ALP SERPL-CCNC: 70 U/L (ref 55–135)
ALT SERPL W/O P-5'-P-CCNC: 21 U/L (ref 10–44)
ANION GAP SERPL CALC-SCNC: 8 MMOL/L (ref 8–16)
AST SERPL-CCNC: 25 U/L (ref 10–40)
BASOPHILS # BLD AUTO: 0.05 K/UL (ref 0–0.2)
BASOPHILS NFR BLD: 0.7 % (ref 0–1.9)
BILIRUB SERPL-MCNC: 0.5 MG/DL (ref 0.1–1)
BUN SERPL-MCNC: 18 MG/DL (ref 6–20)
CALCIUM SERPL-MCNC: 9.5 MG/DL (ref 8.7–10.5)
CHLORIDE SERPL-SCNC: 104 MMOL/L (ref 95–110)
CHOLEST SERPL-MCNC: 191 MG/DL (ref 120–199)
CHOLEST/HDLC SERPL: 2.9 {RATIO} (ref 2–5)
CO2 SERPL-SCNC: 28 MMOL/L (ref 23–29)
CREAT SERPL-MCNC: 0.9 MG/DL (ref 0.5–1.4)
DIFFERENTIAL METHOD: ABNORMAL
EOSINOPHIL # BLD AUTO: 0.5 K/UL (ref 0–0.5)
EOSINOPHIL NFR BLD: 6.4 % (ref 0–8)
ERYTHROCYTE [DISTWIDTH] IN BLOOD BY AUTOMATED COUNT: 13.5 % (ref 11.5–14.5)
EST. GFR  (AFRICAN AMERICAN): >60 ML/MIN/1.73 M^2
EST. GFR  (NON AFRICAN AMERICAN): >60 ML/MIN/1.73 M^2
ESTIMATED AVG GLUCOSE: 97 MG/DL (ref 68–131)
GLUCOSE SERPL-MCNC: 93 MG/DL (ref 70–110)
HBA1C MFR BLD HPLC: 5 % (ref 4–5.6)
HCT VFR BLD AUTO: 51.7 % (ref 40–54)
HDLC SERPL-MCNC: 65 MG/DL (ref 40–75)
HDLC SERPL: 34 % (ref 20–50)
HGB BLD-MCNC: 16.4 G/DL (ref 14–18)
IMM GRANULOCYTES # BLD AUTO: 0.02 K/UL (ref 0–0.04)
IMM GRANULOCYTES NFR BLD AUTO: 0.3 % (ref 0–0.5)
LDLC SERPL CALC-MCNC: 115 MG/DL (ref 63–159)
LYMPHOCYTES # BLD AUTO: 2.4 K/UL (ref 1–4.8)
LYMPHOCYTES NFR BLD: 31.9 % (ref 18–48)
MCH RBC QN AUTO: 32.5 PG (ref 27–31)
MCHC RBC AUTO-ENTMCNC: 31.7 G/DL (ref 32–36)
MCV RBC AUTO: 103 FL (ref 82–98)
MONOCYTES # BLD AUTO: 0.7 K/UL (ref 0.3–1)
MONOCYTES NFR BLD: 8.9 % (ref 4–15)
NEUTROPHILS # BLD AUTO: 3.8 K/UL (ref 1.8–7.7)
NEUTROPHILS NFR BLD: 51.8 % (ref 38–73)
NONHDLC SERPL-MCNC: 126 MG/DL
NRBC BLD-RTO: 0 /100 WBC
PLATELET # BLD AUTO: 298 K/UL (ref 150–350)
PMV BLD AUTO: 10.4 FL (ref 9.2–12.9)
POTASSIUM SERPL-SCNC: 4.2 MMOL/L (ref 3.5–5.1)
PROT SERPL-MCNC: 6.7 G/DL (ref 6–8.4)
RBC # BLD AUTO: 5.04 M/UL (ref 4.6–6.2)
SODIUM SERPL-SCNC: 140 MMOL/L (ref 136–145)
TRIGL SERPL-MCNC: 55 MG/DL (ref 30–150)
TSH SERPL DL<=0.005 MIU/L-ACNC: 0.93 UIU/ML (ref 0.4–4)
WBC # BLD AUTO: 7.39 K/UL (ref 3.9–12.7)

## 2020-04-28 PROCEDURE — 36415 COLL VENOUS BLD VENIPUNCTURE: CPT | Mod: PO

## 2020-04-28 PROCEDURE — 80053 COMPREHEN METABOLIC PANEL: CPT

## 2020-04-28 PROCEDURE — 84443 ASSAY THYROID STIM HORMONE: CPT

## 2020-04-28 PROCEDURE — 80061 LIPID PANEL: CPT

## 2020-04-28 PROCEDURE — 83036 HEMOGLOBIN GLYCOSYLATED A1C: CPT

## 2020-04-28 PROCEDURE — 85025 COMPLETE CBC W/AUTO DIFF WBC: CPT

## 2020-06-17 ENCOUNTER — OFFICE VISIT (OUTPATIENT)
Dept: PSYCHIATRY | Facility: CLINIC | Age: 45
End: 2020-06-17
Payer: COMMERCIAL

## 2020-06-17 DIAGNOSIS — Z63.5 MARITAL CONFLICT INVOLVING DIVORCE: ICD-10-CM

## 2020-06-17 DIAGNOSIS — F43.20 ADJUSTMENT DISORDER, UNSPECIFIED TYPE: Primary | ICD-10-CM

## 2020-06-17 PROCEDURE — 90832 PR PSYCHOTHERAPY W/PATIENT, 30 MIN: ICD-10-PCS | Mod: 95,,, | Performed by: SOCIAL WORKER

## 2020-06-17 PROCEDURE — 90832 PSYTX W PT 30 MINUTES: CPT | Mod: 95,,, | Performed by: SOCIAL WORKER

## 2020-06-17 SDOH — SOCIAL DETERMINANTS OF HEALTH (SDOH): DISRUPTION OF FAMILY BY SEPARATION AND DIVORCE: Z63.5

## 2020-06-17 NOTE — PROGRESS NOTES
The patient location is: home  The chief complaint leading to consultation is: anxiety, marital conflict  Visit type: audiovisual  Total time spent with patient: 30 minutes  Each patient to whom he or she provides medical services by telemedicine is:  (1) informed of the relationship between the physician and patient and the respective role of any other health care provider with respect to management of the patient; and (2) notified that he or she may decline to receive medical services by telemedicine and may withdraw from such care at any time.    Notes: Patient returned for psychotherapy. Reports that he is doing well. Reports that he is still working from home. Is working more from home but is able to get to the tennis court in the middle of the day and that helps his mood. States that home life is the same. Reports that there is much more conversation happening at home. They are not talking about relationship but thinks that they are getting close. States that wife is much more apt to text or call him to check in. Discussed that he is still doing what he can at home to help out. Discussed that he thinks relationship is getting better.     Encounter Diagnoses   Name Primary?    Adjustment disorder, unspecified type Yes    Marital conflict involving divorce

## 2020-09-22 ENCOUNTER — PATIENT MESSAGE (OUTPATIENT)
Dept: ALLERGY | Facility: CLINIC | Age: 45
End: 2020-09-22

## 2020-09-23 RX ORDER — FLUOCINONIDE 0.5 MG/G
CREAM TOPICAL 2 TIMES DAILY
COMMUNITY
End: 2021-12-08

## 2020-09-24 NOTE — PROGRESS NOTES
Allergy Clinic Note  [facility]    Subjective:      Patient ID: Ever Rubin III is a 45 y.o. male.    Chief Complaint: Alopecia (diagnosed 3 months ago) and Medication Refill      Allergy problem list:     Asthma, moderate persistent --   Smoking  GERD  Cat and dog exposure  Allergic rhinitis:  Cat, dog, mite (per pt)  (ADHD)    History of Present Illness:  44-year-old male with asthma and rhinitis is here for scheduled follow up.  He requests refills and a change of ppi.    Related medications  Symbicort prn  Astelin  2 sprays BID  Flonase 1-2 sprays BID  Omeprazole 40 mg  hydroxizine HS  Zyrtec -- occasional use    Currently asthma and rhinitis are both well controlled.  He used Dymista in the past but now, because of insurance coverage, he uses Flonase and azelastine individually.  reports that previous testing by me was positive for cats, dog, and dust mites.    His heartburn has been a problem.  He feels like resolve is no longer working.  He would like to try Protonix.    Since last visit he has a diagnosis of alopecia areata for the last 3 months.  It started on his scalp, became diffuse and now also includes part of his mustache his eyelashes and an area on his arm.  He is being treated by Dr. Og Maciel, [place] dermatologist, with high dose topical steroids and injections of steroids once a month.  He reports some improvement.  He requests workup for autoimmune processes.        Patient has a history of asthma that is difficult to categorize.  Symptoms are highly intermittent; however, exacerbations can be moderate to severe and difficult to control.  He has alternated between p.r.n. and preventive medicines in the past.  Currently he is not taking any controller medicines for asthma.    Additional History:   Interval history is unremarkable.  Past medical history is significant for smoking, ADHD, depression, and GERD.    Family history is significant for asthma and allergic rhinitis in his  mother .  Client  reports that he has been smoking.  Currently he is smoking 1-1/2 packs per day.  This is up from his baseline of 0.5 packs per day.  He describes significant psychosocial stressors at home and has recently moved to temporary lodgings.   Exposures are notable for for indoor cats, 3 outdoor cats, and 1 indoor dog.  In his bed are 2 cats and 1 dog.  No exposure to smoke, mold, or unusual substances.  He plays tennis several times per week.      Patient Active Problem List   Diagnosis    ADHD (attention deficit hyperactivity disorder)    Tobacco dependence    Calculus of gallbladder without cholecystitis without obstruction     Current Outpatient Medications on File Prior to Visit   Medication Sig Dispense Refill    cyclobenzaprine (FLEXERIL) 10 MG tablet Take 10 mg by mouth 3 (three) times daily as needed.       DERMATITIS ANTIGENS TOP Apply topically.      hydrOXYzine HCl (ATARAX) 25 MG tablet TK 1 T PO  QHS  5    ibuprofen (ADVIL,MOTRIN) 800 MG tablet Take 800 mg by mouth 3 (three) times daily as needed for Pain.      multivitamin (THERAGRAN) per tablet Take 1 tablet by mouth once daily.      naproxen sodium (ANAPROX) 220 MG tablet Take 220 mg by mouth every 12 (twelve) hours.      NON FORMULARY MEDICATION Minoxidil 7% with Finasteride 0.1%    Apply to scalp at bedtime      nut.soy,lac-red/dha/epa/FOS/in (PEPTAMEN BARIATRIC ORAL) Take 2 capsules by mouth once daily.      VYVANSE 60 mg capsule Take 60 mg by mouth every morning.   0    [DISCONTINUED] azelastine (ASTELIN) 137 mcg (0.1 %) nasal spray SPRAY 2 SPRAYS BY NASAL ROUTE TWICE DAILY 30 mL 1    [DISCONTINUED] fluticasone propionate (FLONASE) 50 mcg/actuation nasal spray SHAKE LIQUID AND USE 2 SPRAYS(100 MCG) IN EACH NOSTRIL TWICE DAILY 16 g 1    [DISCONTINUED] omeprazole (PRILOSEC) 40 MG capsule Take 1 capsule (40 mg total) by mouth once daily. 90 capsule 0    ascorbic acid, vitamin C, (VITAMIN C) 500 MG tablet Take 500 mg by  "mouth once daily.      budesonide-formoterol 160-4.5 mcg (SYMBICORT) 160-4.5 mcg/actuation HFAA Inhale 2 puffs into the lungs 2 (two) times daily as needed. See written instructions. (Patient not taking: Reported on 4/17/2020) 1 Inhaler 3    fluocinonide 0.05% (LIDEX) 0.05 % cream Apply topically 2 (two) times daily.      hyaluronic acid, hydrolyzed (HYDROLYZED HYALURONIC ACID MISC) by Misc.(Non-Drug; Combo Route) route.      WAL-ITIN D  mg per 24 hr tablet TAKE 1 TABLET BY MOUTH EVERY DAY AS NEEDED FOR ALLERGIES (Patient not taking: Reported on 4/17/2020) 90 tablet 0     No current facility-administered medications on file prior to visit.          Review of Systems   Constitutional: Negative for chills and fever.   HENT: Negative for ear discharge and nosebleeds.    Eyes: Negative for discharge and redness.   Respiratory: Negative for hemoptysis, sputum production, shortness of breath, wheezing and stridor.    Cardiovascular: Negative for chest pain and palpitations.   Gastrointestinal: Negative for blood in stool, melena and vomiting.   Genitourinary: Negative for flank pain and hematuria.   Skin: Negative for itching and rash.        Severe hair loss in patchy distribution on scalp   Neurological: Negative for seizures and loss of consciousness.   Endo/Heme/Allergies: Positive for environmental allergies. Does not bruise/bleed easily.       Objective:   Ht 5' 10" (1.778 m)   Wt 89.5 kg (197 lb 5 oz)   BMI 28.31 kg/m²       Physical Exam   Constitutional: He is well-developed, well-nourished, and in no distress.   HENT:   Head: Normocephalic and atraumatic.   Nose: Nose normal.   Eyes: Conjunctivae are normal. Right eye exhibits no discharge. Left eye exhibits no discharge.   Neck: Neck supple.   Cardiovascular: Normal rate, regular rhythm and intact distal pulses.   Pulmonary/Chest: Effort normal. No stridor. No respiratory distress.   Abdominal: He exhibits no distension.   Musculoskeletal:         " General: No deformity or edema.   Neurological: He is alert. GCS score is 15.   Skin: No rash noted. No erythema.   Multiple bald patches on scalp, with some new growth.  Partial loss of Upper eye lash.  Loss of hair on right forearm in an oval shaped area.   Psychiatric: Memory, affect and judgment normal.       Data:  Last visit Peak flow 370  Eo count 400 on CBC from 05/16/2018    Assessment:     1. Alopecia areata universalis    2. Gastroesophageal reflux disease, esophagitis presence not specified    3. Allergic rhinitis, unspecified seasonality, unspecified trigger        Plan:     Medical decision making:  Patient has rhinitis is adequately controlled.  He has had no further bronchospasm.  I recommend he continue his current medicines.  At his request, I am ordering routine labs including fasting glucose, lipids and hemoglobin A1c.    Diagnoses and all orders for this visit:          Dust allergy  Refilled Astelin and Flonase      Mild intermittent asthma, infection trigger  Continue Symbicort prn    GERD  Prescribed Protonix 40 mg daily    Alopecia areata  Ordered labs as recommended by up-to-date medical web sites:  Iron, ferritin, TSH, RPR.  Also ordered anti thyroid antibodies and CRP.    Patient Instructions   Follow-up labs by portal in 2 weeks    Return here 1 year or sooner if needed    Recommend Dr. Crabtree or Dr. Baron in as primary care physician      No follow-ups on file.    Vilma Lord MD

## 2020-09-25 ENCOUNTER — OFFICE VISIT (OUTPATIENT)
Dept: ALLERGY | Facility: CLINIC | Age: 45
End: 2020-09-25
Payer: COMMERCIAL

## 2020-09-25 VITALS — BODY MASS INDEX: 28.25 KG/M2 | WEIGHT: 197.31 LBS | HEIGHT: 70 IN

## 2020-09-25 DIAGNOSIS — J30.9 ALLERGIC RHINITIS, UNSPECIFIED SEASONALITY, UNSPECIFIED TRIGGER: ICD-10-CM

## 2020-09-25 DIAGNOSIS — K21.9 GASTROESOPHAGEAL REFLUX DISEASE, ESOPHAGITIS PRESENCE NOT SPECIFIED: ICD-10-CM

## 2020-09-25 DIAGNOSIS — L63.1 ALOPECIA AREATA UNIVERSALIS: Primary | ICD-10-CM

## 2020-09-25 PROCEDURE — 99214 PR OFFICE/OUTPT VISIT, EST, LEVL IV, 30-39 MIN: ICD-10-PCS | Mod: S$GLB,,, | Performed by: STUDENT IN AN ORGANIZED HEALTH CARE EDUCATION/TRAINING PROGRAM

## 2020-09-25 PROCEDURE — 3008F BODY MASS INDEX DOCD: CPT | Mod: CPTII,S$GLB,, | Performed by: STUDENT IN AN ORGANIZED HEALTH CARE EDUCATION/TRAINING PROGRAM

## 2020-09-25 PROCEDURE — 99214 OFFICE O/P EST MOD 30 MIN: CPT | Mod: S$GLB,,, | Performed by: STUDENT IN AN ORGANIZED HEALTH CARE EDUCATION/TRAINING PROGRAM

## 2020-09-25 PROCEDURE — 99999 PR PBB SHADOW E&M-EST. PATIENT-LVL III: CPT | Mod: PBBFAC,,, | Performed by: STUDENT IN AN ORGANIZED HEALTH CARE EDUCATION/TRAINING PROGRAM

## 2020-09-25 PROCEDURE — 3008F PR BODY MASS INDEX (BMI) DOCUMENTED: ICD-10-PCS | Mod: CPTII,S$GLB,, | Performed by: STUDENT IN AN ORGANIZED HEALTH CARE EDUCATION/TRAINING PROGRAM

## 2020-09-25 PROCEDURE — 99999 PR PBB SHADOW E&M-EST. PATIENT-LVL III: ICD-10-PCS | Mod: PBBFAC,,, | Performed by: STUDENT IN AN ORGANIZED HEALTH CARE EDUCATION/TRAINING PROGRAM

## 2020-09-25 RX ORDER — AZELASTINE 1 MG/ML
2 SPRAY, METERED NASAL 2 TIMES DAILY PRN
Qty: 30 ML | Refills: 11 | Status: SHIPPED | OUTPATIENT
Start: 2020-09-25 | End: 2021-10-11

## 2020-09-25 RX ORDER — PANTOPRAZOLE SODIUM 40 MG/1
40 TABLET, DELAYED RELEASE ORAL DAILY
Qty: 30 TABLET | Refills: 11 | Status: SHIPPED | OUTPATIENT
Start: 2020-09-25 | End: 2021-12-08

## 2020-09-25 RX ORDER — FLUTICASONE PROPIONATE 50 MCG
SPRAY, SUSPENSION (ML) NASAL
Qty: 16 G | Refills: 1 | Status: SHIPPED | OUTPATIENT
Start: 2020-09-25 | End: 2021-01-12 | Stop reason: SDUPTHER

## 2020-09-25 NOTE — PATIENT INSTRUCTIONS
Follow-up labs by portal in 2 weeks    Return here 1 year or sooner if needed    Recommend Dr. Crabtree or Dr. Baron in as primary care physician

## 2020-10-05 ENCOUNTER — PATIENT MESSAGE (OUTPATIENT)
Dept: ADMINISTRATIVE | Facility: HOSPITAL | Age: 45
End: 2020-10-05

## 2021-01-05 ENCOUNTER — PATIENT MESSAGE (OUTPATIENT)
Dept: ADMINISTRATIVE | Facility: HOSPITAL | Age: 46
End: 2021-01-05

## 2021-01-12 DIAGNOSIS — J30.9 ALLERGIC RHINITIS, UNSPECIFIED SEASONALITY, UNSPECIFIED TRIGGER: ICD-10-CM

## 2021-01-12 RX ORDER — FLUTICASONE PROPIONATE 50 MCG
SPRAY, SUSPENSION (ML) NASAL
Qty: 16 G | Refills: 1 | Status: SHIPPED | OUTPATIENT
Start: 2021-01-12 | End: 2021-05-21 | Stop reason: SDUPTHER

## 2021-04-29 ENCOUNTER — TELEPHONE (OUTPATIENT)
Dept: ALLERGY | Facility: CLINIC | Age: 46
End: 2021-04-29

## 2021-05-21 DIAGNOSIS — J30.9 ALLERGIC RHINITIS, UNSPECIFIED SEASONALITY, UNSPECIFIED TRIGGER: ICD-10-CM

## 2021-05-21 RX ORDER — FLUTICASONE PROPIONATE 50 MCG
SPRAY, SUSPENSION (ML) NASAL
Qty: 16 G | Refills: 1 | Status: SHIPPED | OUTPATIENT
Start: 2021-05-21 | End: 2021-12-08 | Stop reason: SDUPTHER

## 2021-06-02 ENCOUNTER — TELEPHONE (OUTPATIENT)
Dept: ALLERGY | Facility: CLINIC | Age: 46
End: 2021-06-02

## 2021-06-18 ENCOUNTER — TELEPHONE (OUTPATIENT)
Dept: ALLERGY | Facility: CLINIC | Age: 46
End: 2021-06-18

## 2021-10-11 DIAGNOSIS — J30.9 ALLERGIC RHINITIS, UNSPECIFIED SEASONALITY, UNSPECIFIED TRIGGER: ICD-10-CM

## 2021-10-11 RX ORDER — AZELASTINE 1 MG/ML
SPRAY, METERED NASAL
Qty: 20 ML | Refills: 0 | Status: SHIPPED | OUTPATIENT
Start: 2021-10-11 | End: 2021-12-08 | Stop reason: SDUPTHER

## 2021-12-08 ENCOUNTER — OFFICE VISIT (OUTPATIENT)
Dept: ALLERGY | Facility: CLINIC | Age: 46
End: 2021-12-08
Payer: COMMERCIAL

## 2021-12-08 VITALS — WEIGHT: 196.63 LBS | BODY MASS INDEX: 28.15 KG/M2 | HEIGHT: 70 IN

## 2021-12-08 DIAGNOSIS — J30.9 CHRONIC ALLERGIC RHINITIS: ICD-10-CM

## 2021-12-08 DIAGNOSIS — F90.8 ATTENTION DEFICIT HYPERACTIVITY DISORDER (ADHD), OTHER TYPE: ICD-10-CM

## 2021-12-08 DIAGNOSIS — J45.20 MILD INTERMITTENT ASTHMA, UNSPECIFIED WHETHER COMPLICATED: Primary | ICD-10-CM

## 2021-12-08 DIAGNOSIS — J30.81 ALLERGIC RHINITIS DUE TO ANIMAL (CAT) (DOG) HAIR AND DANDER: ICD-10-CM

## 2021-12-08 DIAGNOSIS — K21.9 GASTROESOPHAGEAL REFLUX DISEASE, UNSPECIFIED WHETHER ESOPHAGITIS PRESENT: ICD-10-CM

## 2021-12-08 DIAGNOSIS — J30.89 ALLERGIC RHINITIS DUE TO HOUSE DUST MITE: ICD-10-CM

## 2021-12-08 DIAGNOSIS — F17.200 TOBACCO DEPENDENCE: ICD-10-CM

## 2021-12-08 DIAGNOSIS — M79.10 MUSCLE PAIN: ICD-10-CM

## 2021-12-08 PROCEDURE — 99999 PR PBB SHADOW E&M-EST. PATIENT-LVL III: CPT | Mod: PBBFAC,,, | Performed by: STUDENT IN AN ORGANIZED HEALTH CARE EDUCATION/TRAINING PROGRAM

## 2021-12-08 PROCEDURE — 99215 OFFICE O/P EST HI 40 MIN: CPT | Mod: S$GLB,,, | Performed by: STUDENT IN AN ORGANIZED HEALTH CARE EDUCATION/TRAINING PROGRAM

## 2021-12-08 PROCEDURE — 99999 PR PBB SHADOW E&M-EST. PATIENT-LVL III: ICD-10-PCS | Mod: PBBFAC,,, | Performed by: STUDENT IN AN ORGANIZED HEALTH CARE EDUCATION/TRAINING PROGRAM

## 2021-12-08 PROCEDURE — 99215 PR OFFICE/OUTPT VISIT, EST, LEVL V, 40-54 MIN: ICD-10-PCS | Mod: S$GLB,,, | Performed by: STUDENT IN AN ORGANIZED HEALTH CARE EDUCATION/TRAINING PROGRAM

## 2021-12-08 RX ORDER — ESOMEPRAZOLE MAGNESIUM 40 MG/1
40 GRANULE, DELAYED RELEASE ORAL
COMMUNITY
End: 2023-02-09 | Stop reason: DRUGHIGH

## 2021-12-08 RX ORDER — AZELASTINE 1 MG/ML
SPRAY, METERED NASAL
Qty: 60 ML | Refills: 5 | Status: SHIPPED | OUTPATIENT
Start: 2021-12-08 | End: 2022-11-17

## 2021-12-08 RX ORDER — BUDESONIDE AND FORMOTEROL FUMARATE DIHYDRATE 160; 4.5 UG/1; UG/1
2 AEROSOL RESPIRATORY (INHALATION) 2 TIMES DAILY PRN
Qty: 6 G | Refills: 1 | Status: SHIPPED | OUTPATIENT
Start: 2021-12-08 | End: 2022-03-16 | Stop reason: SDUPTHER

## 2021-12-08 RX ORDER — IBUPROFEN 800 MG/1
800 TABLET ORAL 3 TIMES DAILY PRN
Qty: 90 TABLET | Refills: 3 | Status: SHIPPED | OUTPATIENT
Start: 2021-12-08 | End: 2022-09-19

## 2021-12-08 RX ORDER — FLUTICASONE PROPIONATE 50 MCG
SPRAY, SUSPENSION (ML) NASAL
Qty: 31.6 G | Refills: 5 | Status: SHIPPED | OUTPATIENT
Start: 2021-12-08 | End: 2022-11-17

## 2021-12-08 RX ORDER — TIOCONAZOLE 6.5 %
OINTMENT WITH PREFILLED APPLICATOR VAGINAL
Qty: 90 TABLET | Refills: 0 | Status: SHIPPED | OUTPATIENT
Start: 2021-12-08 | End: 2023-02-09 | Stop reason: DRUGHIGH

## 2021-12-31 NOTE — BRIEF OP NOTE
[facility]  Brief Operative Note    SUMMARY     Surgery Date: 1/26/2018     Surgeon(s) and Role:     * Brent Boudreaux MD - Primary    Assisting Surgeon: None    Pre-op Diagnosis:  Incarcerated rt inguinal hernia    Post-op Diagnosis:  Post-Op Diagnosis Codes:    same    Procedure(s) (LRB):  REPAIR-HERNIA-INGUINAL-INITIAL (5 YRS+) (Right)    Anesthesia: General    Description of Procedure:same  Description of the findings of the procedure:same    Estimated Blood Loss: 50cc         Specimens:   Specimen (12h ago through future)    None         Clear bilaterally, pupils equal, round and reactive to light.

## 2022-03-16 DIAGNOSIS — J45.20 MILD INTERMITTENT ASTHMA, UNSPECIFIED WHETHER COMPLICATED: ICD-10-CM

## 2022-03-16 RX ORDER — BUDESONIDE AND FORMOTEROL FUMARATE DIHYDRATE 160; 4.5 UG/1; UG/1
2 AEROSOL RESPIRATORY (INHALATION) 2 TIMES DAILY PRN
Qty: 6 G | Refills: 1 | Status: SHIPPED | OUTPATIENT
Start: 2022-03-16 | End: 2022-03-24 | Stop reason: SDUPTHER

## 2022-03-16 NOTE — TELEPHONE ENCOUNTER
----- Message from Juan Castro sent at 3/16/2022 11:56 AM CDT -----  Contact: Pharmacy @ [phone]  Caller calling to get an update the refill on ( budesonide-formoterol 160-4.5 mcg (SYMBICORT) 160-4.5 mcg/actuation HFAA) pls call or send to:    [id number] - [place], [place] - [address] [facility] [address]  [address] [facility] [address]  [facility] [address]  Phone: [phone] Fax: [phone]

## 2022-03-18 ENCOUNTER — TELEPHONE (OUTPATIENT)
Dept: ALLERGY | Facility: CLINIC | Age: 47
End: 2022-03-18
Payer: COMMERCIAL

## 2022-03-18 NOTE — TELEPHONE ENCOUNTER
Spoke to Krystle with [facility] pharmacy.  Per Krystle Symbicort inhalers are 10.4 grams but current rx says 6 grams.  Okayed to dispense 1 inhaler(10.4 g) with 1 refill.

## 2022-03-18 NOTE — TELEPHONE ENCOUNTER
----- Message from Chin Mendez sent at 3/18/2022  1:04 PM CDT -----  Regarding: RX Clarification  Contact: [facility] called in regards to RX, budesonide-formoterol 160-4.5 mcg (SYMBICORT) 160-4.5. Requesting a call back to clarify.            Meseret@[phone]

## 2022-03-24 DIAGNOSIS — J45.20 MILD INTERMITTENT ASTHMA, UNSPECIFIED WHETHER COMPLICATED: ICD-10-CM

## 2022-03-24 RX ORDER — BUDESONIDE AND FORMOTEROL FUMARATE DIHYDRATE 160; 4.5 UG/1; UG/1
2 AEROSOL RESPIRATORY (INHALATION) 2 TIMES DAILY PRN
Qty: 6 G | Refills: 1 | Status: SHIPPED | OUTPATIENT
Start: 2022-03-24 | End: 2023-02-09 | Stop reason: SDUPTHER

## 2022-11-17 ENCOUNTER — PATIENT MESSAGE (OUTPATIENT)
Dept: ALLERGY | Facility: CLINIC | Age: 47
End: 2022-11-17
Payer: COMMERCIAL

## 2022-11-17 DIAGNOSIS — J30.9 CHRONIC ALLERGIC RHINITIS: ICD-10-CM

## 2022-11-17 RX ORDER — FLUTICASONE PROPIONATE 50 MCG
SPRAY, SUSPENSION (ML) NASAL
Qty: 15.8 ML | Refills: 1 | Status: SHIPPED | OUTPATIENT
Start: 2022-11-17 | End: 2023-02-09 | Stop reason: SDUPTHER

## 2022-11-17 RX ORDER — AZELASTINE 1 MG/ML
SPRAY, METERED NASAL
Qty: 30 ML | Refills: 1 | Status: SHIPPED | OUTPATIENT
Start: 2022-11-17 | End: 2023-02-09 | Stop reason: SDUPTHER

## 2022-11-30 ENCOUNTER — TELEPHONE (OUTPATIENT)
Dept: ALLERGY | Facility: CLINIC | Age: 47
End: 2022-11-30
Payer: COMMERCIAL

## 2022-11-30 NOTE — TELEPHONE ENCOUNTER
----- Message from Hilda Brewer sent at 11/30/2022  2:43 PM CST -----  Contact: [facility] @ [phone]  [facility] calling regarding Pharmacy Authorization (message) for # is checking the request that was sent over for ibuprofen (ADVIL,MOTRIN) 800 MG tablet

## 2022-12-01 ENCOUNTER — TELEPHONE (OUTPATIENT)
Dept: ALLERGY | Facility: CLINIC | Age: 47
End: 2022-12-01
Payer: COMMERCIAL

## 2022-12-01 NOTE — TELEPHONE ENCOUNTER
----- Message from Navneet Stern RN sent at 11/30/2022  5:06 PM CST -----  Contact: [employer] @ [phone]    ----- Message -----  From: Hilda Brewer  Sent: 11/30/2022   2:50 PM CST  To: Risa Esparza Staff    [facility] calling regarding Pharmacy Authorization (message) for # is checking the request that was sent over for ibuprofen (ADVIL,MOTRIN) 800 MG tablet

## 2023-02-09 ENCOUNTER — OFFICE VISIT (OUTPATIENT)
Dept: ALLERGY | Facility: CLINIC | Age: 48
End: 2023-02-09
Payer: COMMERCIAL

## 2023-02-09 VITALS
DIASTOLIC BLOOD PRESSURE: 77 MMHG | SYSTOLIC BLOOD PRESSURE: 128 MMHG | BODY MASS INDEX: 29.32 KG/M2 | WEIGHT: 204.81 LBS | HEIGHT: 70 IN | HEART RATE: 84 BPM | OXYGEN SATURATION: 99 %

## 2023-02-09 DIAGNOSIS — J30.81 ALLERGIC RHINITIS DUE TO ANIMAL (CAT) (DOG) HAIR AND DANDER: ICD-10-CM

## 2023-02-09 DIAGNOSIS — J45.20 MILD INTERMITTENT ASTHMA, UNSPECIFIED WHETHER COMPLICATED: ICD-10-CM

## 2023-02-09 DIAGNOSIS — J45.30 MILD PERSISTENT ASTHMA, UNSPECIFIED WHETHER COMPLICATED: ICD-10-CM

## 2023-02-09 DIAGNOSIS — F90.8 ATTENTION DEFICIT HYPERACTIVITY DISORDER (ADHD), OTHER TYPE: ICD-10-CM

## 2023-02-09 DIAGNOSIS — G47.00 INSOMNIA, UNSPECIFIED TYPE: ICD-10-CM

## 2023-02-09 DIAGNOSIS — J30.89 ALLERGIC RHINITIS DUE TO HOUSE DUST MITE: ICD-10-CM

## 2023-02-09 DIAGNOSIS — K21.9 GASTROESOPHAGEAL REFLUX DISEASE, UNSPECIFIED WHETHER ESOPHAGITIS PRESENT: ICD-10-CM

## 2023-02-09 DIAGNOSIS — J30.9 CHRONIC ALLERGIC RHINITIS: Primary | ICD-10-CM

## 2023-02-09 DIAGNOSIS — Z72.0 TOBACCO ABUSE: ICD-10-CM

## 2023-02-09 DIAGNOSIS — F17.200 TOBACCO DEPENDENCE: ICD-10-CM

## 2023-02-09 PROBLEM — H34.211 HOLLENHORST PLAQUE, RIGHT EYE: Status: ACTIVE | Noted: 2019-09-05

## 2023-02-09 PROBLEM — H35.9 RETINAL DISEASE: Status: ACTIVE | Noted: 2023-02-09

## 2023-02-09 PROCEDURE — 99215 OFFICE O/P EST HI 40 MIN: CPT | Mod: S$GLB,,, | Performed by: STUDENT IN AN ORGANIZED HEALTH CARE EDUCATION/TRAINING PROGRAM

## 2023-02-09 PROCEDURE — 99999 PR PBB SHADOW E&M-EST. PATIENT-LVL IV: CPT | Mod: PBBFAC,,, | Performed by: STUDENT IN AN ORGANIZED HEALTH CARE EDUCATION/TRAINING PROGRAM

## 2023-02-09 PROCEDURE — 1159F PR MEDICATION LIST DOCUMENTED IN MEDICAL RECORD: ICD-10-PCS | Mod: CPTII,S$GLB,, | Performed by: STUDENT IN AN ORGANIZED HEALTH CARE EDUCATION/TRAINING PROGRAM

## 2023-02-09 PROCEDURE — 1159F MED LIST DOCD IN RCRD: CPT | Mod: CPTII,S$GLB,, | Performed by: STUDENT IN AN ORGANIZED HEALTH CARE EDUCATION/TRAINING PROGRAM

## 2023-02-09 PROCEDURE — 3008F BODY MASS INDEX DOCD: CPT | Mod: CPTII,S$GLB,, | Performed by: STUDENT IN AN ORGANIZED HEALTH CARE EDUCATION/TRAINING PROGRAM

## 2023-02-09 PROCEDURE — 3008F PR BODY MASS INDEX (BMI) DOCUMENTED: ICD-10-PCS | Mod: CPTII,S$GLB,, | Performed by: STUDENT IN AN ORGANIZED HEALTH CARE EDUCATION/TRAINING PROGRAM

## 2023-02-09 PROCEDURE — 1160F RVW MEDS BY RX/DR IN RCRD: CPT | Mod: CPTII,S$GLB,, | Performed by: STUDENT IN AN ORGANIZED HEALTH CARE EDUCATION/TRAINING PROGRAM

## 2023-02-09 PROCEDURE — 3078F DIAST BP <80 MM HG: CPT | Mod: CPTII,S$GLB,, | Performed by: STUDENT IN AN ORGANIZED HEALTH CARE EDUCATION/TRAINING PROGRAM

## 2023-02-09 PROCEDURE — 99215 PR OFFICE/OUTPT VISIT, EST, LEVL V, 40-54 MIN: ICD-10-PCS | Mod: S$GLB,,, | Performed by: STUDENT IN AN ORGANIZED HEALTH CARE EDUCATION/TRAINING PROGRAM

## 2023-02-09 PROCEDURE — 1160F PR REVIEW ALL MEDS BY PRESCRIBER/CLIN PHARMACIST DOCUMENTED: ICD-10-PCS | Mod: CPTII,S$GLB,, | Performed by: STUDENT IN AN ORGANIZED HEALTH CARE EDUCATION/TRAINING PROGRAM

## 2023-02-09 PROCEDURE — 3078F PR MOST RECENT DIASTOLIC BLOOD PRESSURE < 80 MM HG: ICD-10-PCS | Mod: CPTII,S$GLB,, | Performed by: STUDENT IN AN ORGANIZED HEALTH CARE EDUCATION/TRAINING PROGRAM

## 2023-02-09 PROCEDURE — 3074F SYST BP LT 130 MM HG: CPT | Mod: CPTII,S$GLB,, | Performed by: STUDENT IN AN ORGANIZED HEALTH CARE EDUCATION/TRAINING PROGRAM

## 2023-02-09 PROCEDURE — 3074F PR MOST RECENT SYSTOLIC BLOOD PRESSURE < 130 MM HG: ICD-10-PCS | Mod: CPTII,S$GLB,, | Performed by: STUDENT IN AN ORGANIZED HEALTH CARE EDUCATION/TRAINING PROGRAM

## 2023-02-09 PROCEDURE — 99999 PR PBB SHADOW E&M-EST. PATIENT-LVL IV: ICD-10-PCS | Mod: PBBFAC,,, | Performed by: STUDENT IN AN ORGANIZED HEALTH CARE EDUCATION/TRAINING PROGRAM

## 2023-02-09 RX ORDER — BUDESONIDE AND FORMOTEROL FUMARATE DIHYDRATE 160; 4.5 UG/1; UG/1
2 AEROSOL RESPIRATORY (INHALATION) 2 TIMES DAILY PRN
Qty: 6 G | Refills: 1 | Status: SHIPPED | OUTPATIENT
Start: 2023-02-09 | End: 2024-02-09

## 2023-02-09 RX ORDER — PSEUDOEPHEDRINE HCL 30 MG
TABLET ORAL
Qty: 180 TABLET | Refills: 2 | Status: SHIPPED | OUTPATIENT
Start: 2023-02-09

## 2023-02-09 RX ORDER — AZELASTINE 1 MG/ML
SPRAY, METERED NASAL
Qty: 30 ML | Refills: 1 | Status: SHIPPED | OUTPATIENT
Start: 2023-02-09 | End: 2023-03-21

## 2023-02-09 RX ORDER — FLUTICASONE PROPIONATE 50 MCG
SPRAY, SUSPENSION (ML) NASAL
Qty: 15.8 ML | Refills: 1 | Status: SHIPPED | OUTPATIENT
Start: 2023-02-09 | End: 2023-03-02

## 2023-02-09 RX ORDER — ESOMEPRAZOLE MAGNESIUM 40 MG/1
40 CAPSULE, DELAYED RELEASE ORAL 2 TIMES DAILY
Qty: 180 CAPSULE | Refills: 2 | Status: SHIPPED | OUTPATIENT
Start: 2023-02-09 | End: 2023-10-10 | Stop reason: SDUPTHER
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
## 2023-02-09 NOTE — PATIENT INSTRUCTIONS
I suspect throat symptoms are related to reflux      Testing  None        Treatment    Generic Nexxium 40 mg twice a day    Generic Claritin-D (with Sudafed 120 mg slow release) if early in the ay  Little red Sudafed (30mg fast reacting) 1-2 tablets if taking late in the day or in the evening.      After Visit Care    Contact me in 6 weeks if throat symptoms are still a problem, send me a portal message.      Return 1 year or sooner if needed

## 2023-03-28 ENCOUNTER — PATIENT MESSAGE (OUTPATIENT)
Dept: ALLERGY | Facility: CLINIC | Age: 48
End: 2023-03-28
Payer: COMMERCIAL

## 2023-04-30 DIAGNOSIS — J30.9 CHRONIC ALLERGIC RHINITIS: ICD-10-CM

## 2023-05-02 RX ORDER — AZELASTINE 1 MG/ML
SPRAY, METERED NASAL
Qty: 30 ML | Refills: 5 | Status: SHIPPED | OUTPATIENT
Start: 2023-05-02

## 2023-05-11 RX ORDER — IBUPROFEN 800 MG/1
TABLET ORAL
Qty: 270 TABLET | Refills: 1 | Status: SHIPPED | OUTPATIENT
Start: 2023-05-11

## 2023-10-11 RX ORDER — ESOMEPRAZOLE MAGNESIUM 40 MG/1
40 CAPSULE, DELAYED RELEASE ORAL 2 TIMES DAILY
Qty: 180 CAPSULE | Refills: 1 | Status: SHIPPED | OUTPATIENT
Start: 2023-10-11 | End: 2024-02-29

## 2023-10-11 NOTE — TELEPHONE ENCOUNTER
LOV: 2/09/2023    Patient Comment: Tried to request refill from [employer] [facility] but [facility] errored out

## 2024-02-29 RX ORDER — ESOMEPRAZOLE MAGNESIUM 40 MG/1
40 CAPSULE, DELAYED RELEASE ORAL 2 TIMES DAILY
Qty: 60 CAPSULE | Refills: 1 | Status: SHIPPED | OUTPATIENT
Start: 2024-02-29

## 2025-05-05 NOTE — PROGRESS NOTES
Ever Rubin III  1975        Subjective     Chief Complaint: Annual Exam and Establish Care      History of Present Illness:  Mr. Ever Rubin III is a 49 y.o. male who presents to clinic for annual.      C-scope: not utd      History of Present Illness    CHIEF COMPLAINT:  Mr. Rubin presents today for an annual physical exam    MEDICAL HISTORY:  He has a diagnosis of ADHD. He reports a possible history of shingles approximately 6 months ago with a lesion on his chin, which was biopsied and treated with Gabapentin. He has chronic sinus problems with a deviated septum requiring surgical repair but declines intervention.    WEIGHT MANAGEMENT:  His weight history includes bariatric surgery when he was over 325 lbs, after which he reached 160 lbs. He currently weighs 208-210 lbs.    VISION:  He has undergone multiple retinal surgeries with subsequent scar tissue buildup on the retina. He experiences visual disturbances, seeing parallel lines as cosine curves, and has depth perception problems. These vision issues have significantly impacted his ability to play tennis due to focusing difficulties. He has also had cataract surgery.    SURGICAL HISTORY:  His surgical history includes bariatric surgery, hernia surgery, multiple retinal surgeries, cataract surgery, hand surgery, and shoulder surgery. Since the hernia surgery, he occasionally experiences sensation of potential protrusion when shifting position while lying down.    CURRENT MEDICATIONS:  He takes Vyvanse, Nexium daily, Astelin and Flonase nasal sprays, Singulair as needed, and Tadalafil 5mg. For sleep, he alternates between Trazodone and Hydroxyzine as needed.    SOCIAL HISTORY:  He smokes half to one pack of cigarettes daily, with variable consumption.      ROS:  Eyes: +blurry vision, +blind spots  ENT: -difficulty swallowing, +post nasal drip  Respiratory: -shortness of breath  Cardiovascular: -chest pain  Gastrointestinal:  +indigestion  Genitourinary: -difficulty urinating, -hematuria        PAST HISTORY:     Past Medical History:   Diagnosis Date    ADHD (attention deficit hyperactivity disorder)     Allergy     Bronchitis     Cataract     Depression     GERD (gastroesophageal reflux disease)     Meningitis     Snores        Past Surgical History:   Procedure Laterality Date    CATARACT EXTRACTION W/ INTRAOCULAR LENS  IMPLANT, BILATERAL      CHOLECYSTECTOMY  2018    EYE SURGERY      bilateral cataract    HERNIA REPAIR  01/26/2018    LAPAROSCOPIC CHOLECYSTECTOMY N/A 05/31/2018    Procedure: CHOLECYSTECTOMY-LAPAROSCOPIC;  Surgeon: Brent Boudreaux MD;  Location: [facility];  Service: General;  Laterality: N/A;   RN PRE OP 5-16-18    SHOULDER SURGERY      SHOULDER SURGERY  2013    Rt shoulder     SLEEVE GASTROPLASTY         Family History   Problem Relation Name Age of Onset    Allergies Mother Dorothy Rubin     Allergic rhinitis Mother Dorothy Rubin     Immunodeficiency Mother Dorothy Rubin     Asthma Mother Dorothy Rubin     COPD Mother Dorothy Rubin     Alcohol abuse Paternal Uncle Hemal Rubin     Drug abuse Paternal Uncle Hemal Rbuin     Early death Paternal Uncle Hemal Rubin     Angioedema Neg Hx      Atopy Neg Hx      Eczema Neg Hx      Rhinitis Neg Hx      Urticaria Neg Hx         Social History     Socioeconomic History    Marital status:      Spouse name: Jenny    Number of children: 2   Occupational History    Occupation: IT     Employer: [employer]   Tobacco Use    Smoking status: Every Day     Current packs/day: 1.00     Average packs/day: 1 pack/day for 30.0 years (30.0 ttl pk-yrs)     Types: Cigarettes    Smokeless tobacco: Never    Tobacco comments:     Between 1/2 to 1 pk per day   Substance and Sexual Activity    Alcohol use: No    Drug use: No    Sexual activity: Yes     Partners: Female     Birth control/protection: None     Social Drivers of Health     Financial Resource Strain: Low Risk  (5/4/2025)    Overall  Financial Resource Strain (CARDIA)     Difficulty of Paying Living Expenses: Not very hard   Food Insecurity: No Food Insecurity (5/4/2025)    Hunger Vital Sign     Worried About Running Out of Food in the Last Year: Never true     Ran Out of Food in the Last Year: Never true   Transportation Needs: No Transportation Needs (5/4/2025)    PRAPARE - Transportation     Lack of Transportation (Medical): No     Lack of Transportation (Non-Medical): No   Physical Activity: Sufficiently Active (5/4/2025)    Exercise Vital Sign     Days of Exercise per Week: 3 days     Minutes of Exercise per Session: 60 min   Stress: Stress Concern Present (5/4/2025)    [place] - Occupational Stress Questionnaire     Feeling of Stress : To some extent   Housing Stability: Low Risk  (5/4/2025)    Housing Stability Vital Sign     Unable to Pay for Housing in the Last Year: No     Number of Times Moved in the Last Year: 0     Homeless in the Last Year: No       MEDICATIONS & ALLERGIES:     Current Outpatient Medications on File Prior to Visit   Medication Sig    trazodone (DESYREL) 25 MG tablet     [DISCONTINUED] montelukast (SINGULAIR) 10 mg tablet Take 10 mg by mouth every evening.    [DISCONTINUED] tadalafiL (CIALIS) 5 MG tablet Take 5 mg by mouth daily as needed for Erectile Dysfunction.    ascorbic acid, vitamin C, (VITAMIN C) 500 MG tablet Take 500 mg by mouth once daily.    budesonide-formoterol 160-4.5 mcg (SYMBICORT) 160-4.5 mcg/actuation HFAA Inhale 2 puffs into the lungs 2 (two) times daily as needed (wheezing). See written instructions.    cyclobenzaprine (FLEXERIL) 10 MG tablet Take 10 mg by mouth 3 (three) times daily as needed.     hydrOXYzine HCl (ATARAX) 25 MG tablet 50 mg.    ibuprofen (ADVIL,MOTRIN) 800 MG tablet Take 1 tablet by mouth three times daily as needed for pain    loratadine-pseudoephedrine 5-120 mg (CLARITIN-D 12-HOUR) 5-120 mg per tablet Take 1 tablet by mouth 2 (two) times  "daily. for 10 days    multivitamin (THERAGRAN) per tablet Take 1 tablet by mouth once daily.    pseudoephedrine (SUDAFED) 30 MG tablet 1-2 tablets for nasal congestion    VYVANSE 60 mg capsule Take 60 mg by mouth every morning.     zinc sulfate (ZINC-15 ORAL) Take by mouth. Takes 3 Gummies by mouth daily.  Not sure of dosage    [DISCONTINUED] azelastine (ASTELIN) 137 mcg (0.1 %) nasal spray Instill 2 sprays in each nostril twice daily as needed. Do not snort.  **Tastes bad.**    [DISCONTINUED] esomeprazole (NEXIUM) 40 MG capsule Take 1 capsule by mouth twice daily.    [DISCONTINUED] fluticasone propionate (FLONASE) 50 mcg/actuation nasal spray Shake liquid and instill 2 sprays in each nostril twice daily.    [DISCONTINUED] nut.soy,lac-red/dha/epa/FOS/in (PEPTAMEN BARIATRIC ORAL) Take 2 capsules by mouth once daily.     No current facility-administered medications on file prior to visit.       Review of patient's allergies indicates:   Allergen Reactions    Penicillins Hives       OBJECTIVE:     Vital Signs:  Vitals:    05/06/25 1122   BP: 112/82   BP Location: Right arm   Patient Position: Sitting   Pulse: 85   Resp: 18   Temp: 97.8 °F (36.6 °C)   TempSrc: Temporal   SpO2: 98%   Weight: 92.5 kg (203 lb 14.8 oz)   Height: 5' 10" (1.778 m)       Body mass index is 29.26 kg/m².     Physical Exam:  Physical Exam  Vitals and nursing note reviewed.   Constitutional:       General: He is not in acute distress.     Appearance: He is not ill-appearing.   HENT:      Head: Normocephalic and atraumatic.      Mouth/Throat:      Mouth: Mucous membranes are moist.      Pharynx: Oropharynx is clear. No oropharyngeal exudate or posterior oropharyngeal erythema.   Eyes:      Extraocular Movements: Extraocular movements intact.      Conjunctiva/sclera: Conjunctivae normal.   Cardiovascular:      Rate and Rhythm: Normal rate and regular rhythm.   Pulmonary:      Effort: Pulmonary effort is normal. No respiratory distress.      Breath " "sounds: Normal breath sounds. No wheezing or rales.   Chest:      Chest wall: No tenderness.   Abdominal:      Palpations: Abdomen is soft.      Tenderness: There is no abdominal tenderness. There is no guarding.   Musculoskeletal:         General: Normal range of motion.      Cervical back: Normal range of motion and neck supple. No tenderness.      Right lower leg: No edema.      Left lower leg: No edema.   Lymphadenopathy:      Cervical: No cervical adenopathy.   Skin:     General: Skin is warm and dry.   Neurological:      Mental Status: He is alert and oriented to person, place, and time.            Laboratory  Lab Results   Component Value Date    WBC 7.39 04/28/2020    HGB 16.4 04/28/2020    HCT 51.7 04/28/2020     (H) 04/28/2020     04/28/2020     Lab Results   Component Value Date    GLU 93 04/28/2020     04/28/2020    K 4.2 04/28/2020     04/28/2020    CO2 28 04/28/2020    BUN 18 04/28/2020    CREATININE 0.9 04/28/2020    CALCIUM 9.5 04/28/2020    MG 1.7 01/28/2018     No results found for: "INR", "PROTIME"  Lab Results   Component Value Date    HGBA1C 5.0 04/28/2020     No results for input(s): "POCTGLUCOSE" in the last 72 hours.      Health Maintenance         Date Due Completion Date    Hepatitis C Screening Never done ---    HIV Screening Never done ---    Pneumococcal Vaccines (Age 50+) (1 of 2 - PCV) Never done ---    Colorectal Cancer Screening Never done ---    Hemoglobin A1c (Diabetic Prevention Screening) 04/28/2023 4/28/2020    Shingles Vaccine (1 of 2) Never done ---    COVID-19 Vaccine (1 - 2024-25 season) Never done ---    Lipid Panel 04/28/2025 4/28/2020    Influenza Vaccine (Season Ended) 09/01/2025 10/17/2019    TETANUS VACCINE 11/07/2026 11/7/2016    RSV Vaccine (Age 60+ and Pregnant patients) (1 - 1-dose 75+ series) 08/10/2048 ---            ASSESSMENT & PLAN:   49 y.o. male who was seen today in clinic for annual.    Annual physical exam  -     Hemoglobin A1C; " Future; Expected date: 05/06/2025  -     PSA, Screening; Future; Expected date: 05/06/2025  -     CBC Without Differential; Future; Expected date: 05/06/2025  -     Comprehensive Metabolic Panel; Future; Expected date: 05/06/2025  -     TSH; Future; Expected date: 05/06/2025  -     Lipid Panel; Future; Expected date: 05/06/2025  -     Hepatitis C Antibody; Future; Expected date: 05/06/2025  -     HIV 1/2 Ag/Ab (4th Gen); Future; Expected date: 05/06/2025    Colon cancer screening    Other specified attention deficit hyperactivity disorder (ADHD)    Tobacco dependence    Gastroesophageal reflux disease, unspecified whether esophagitis present  -     esomeprazole (NEXIUM) 40 MG capsule; Take 1 capsule (40 mg total) by mouth once daily.  Dispense: 90 capsule; Refill: 1    Chronic allergic rhinitis  -     azelastine (ASTELIN) 137 mcg (0.1 %) nasal spray; Instill 2 sprays in each nostril twice daily as needed. Do not snort.  **Tastes bad.**  Dispense: 30 mL; Refill: 5  -     fluticasone propionate (FLONASE) 50 mcg/actuation nasal spray; Shake liquid and instill 2 sprays in each nostril twice daily.  Dispense: 31.6 mL; Refill: 5    Erectile dysfunction, unspecified erectile dysfunction type  -     tadalafiL (CIALIS) 5 MG tablet; Take 1 tablet (5 mg total) by mouth daily as needed for Erectile Dysfunction.  Dispense: 90 tablet; Refill: 0    Retinal disease    Other orders  -     montelukast (SINGULAIR) 10 mg tablet; Take 1 tablet (10 mg total) by mouth every evening.  Dispense: 90 tablet; Refill: 0         1. Annual physical exam    2. Colon cancer screening    3. Other specified attention deficit hyperactivity disorder (ADHD)    4. Tobacco dependence    5. Gastroesophageal reflux disease, unspecified whether esophagitis present    6. Chronic allergic rhinitis    7. Erectile dysfunction, unspecified erectile dysfunction type    8. Retinal disease        1/2.  Fasting labs ordered.  Colon cancer screening with [facility].   Vaccines recommended, deferred today.  3.  Chronic, stable.  Follow up with psych and Dr. Tomasz De Luna  4.  Chronic, discussed importance of cessation.  5.  Chronic, stable  6.  Chronic, stable  7.  Stable, continue prn med  8.  Chronic, s/p surgical intervention.  Follow up with ophthalmology     RTC in 1 year or sooner if needed    Uriel Lerner MD  [facility]    This note was generated with the assistance of ambient listening technology. Verbal consent was obtained by the patient and accompanying visitor(s) for the recording of patient appointment to facilitate this note. I attest to having reviewed and edited the generated note for accuracy, though some syntax or spelling errors may persist. Please contact the author of this note for any clarification.

## 2025-05-06 ENCOUNTER — LAB VISIT (OUTPATIENT)
Dept: LAB | Facility: HOSPITAL | Age: 50
End: 2025-05-06
Payer: COMMERCIAL

## 2025-05-06 ENCOUNTER — PATIENT MESSAGE (OUTPATIENT)
Dept: INTERNAL MEDICINE | Facility: CLINIC | Age: 50
End: 2025-05-06

## 2025-05-06 ENCOUNTER — OFFICE VISIT (OUTPATIENT)
Dept: INTERNAL MEDICINE | Facility: CLINIC | Age: 50
End: 2025-05-06
Payer: COMMERCIAL

## 2025-05-06 VITALS
RESPIRATION RATE: 18 BRPM | OXYGEN SATURATION: 98 % | HEIGHT: 70 IN | HEART RATE: 85 BPM | DIASTOLIC BLOOD PRESSURE: 82 MMHG | SYSTOLIC BLOOD PRESSURE: 112 MMHG | TEMPERATURE: 98 F | BODY MASS INDEX: 29.2 KG/M2 | WEIGHT: 203.94 LBS

## 2025-05-06 DIAGNOSIS — Z00.00 ANNUAL PHYSICAL EXAM: Primary | ICD-10-CM

## 2025-05-06 DIAGNOSIS — F17.200 TOBACCO DEPENDENCE: ICD-10-CM

## 2025-05-06 DIAGNOSIS — Z00.00 ANNUAL PHYSICAL EXAM: ICD-10-CM

## 2025-05-06 DIAGNOSIS — N52.9 ERECTILE DYSFUNCTION, UNSPECIFIED ERECTILE DYSFUNCTION TYPE: ICD-10-CM

## 2025-05-06 DIAGNOSIS — H35.9 RETINAL DISEASE: ICD-10-CM

## 2025-05-06 DIAGNOSIS — K21.9 GASTROESOPHAGEAL REFLUX DISEASE, UNSPECIFIED WHETHER ESOPHAGITIS PRESENT: ICD-10-CM

## 2025-05-06 DIAGNOSIS — F90.8 OTHER SPECIFIED ATTENTION DEFICIT HYPERACTIVITY DISORDER (ADHD): ICD-10-CM

## 2025-05-06 DIAGNOSIS — J30.9 CHRONIC ALLERGIC RHINITIS: ICD-10-CM

## 2025-05-06 DIAGNOSIS — Z12.11 COLON CANCER SCREENING: ICD-10-CM

## 2025-05-06 DIAGNOSIS — J45.20 MILD INTERMITTENT ASTHMA, UNSPECIFIED WHETHER COMPLICATED: ICD-10-CM

## 2025-05-06 PROBLEM — J32.9 CHRONIC SINUSITIS: Status: RESOLVED | Noted: 2025-05-06 | Resolved: 2025-05-06

## 2025-05-06 PROBLEM — J32.9 CHRONIC SINUSITIS: Status: ACTIVE | Noted: 2025-05-06

## 2025-05-06 LAB
ALBUMIN SERPL BCP-MCNC: 4 G/DL (ref 3.5–5.2)
ALP SERPL-CCNC: 71 UNIT/L (ref 40–150)
ALT SERPL W/O P-5'-P-CCNC: 18 UNIT/L (ref 10–44)
ANION GAP (OHS): 9 MMOL/L (ref 8–16)
AST SERPL-CCNC: 23 UNIT/L (ref 11–45)
BILIRUB SERPL-MCNC: 0.4 MG/DL (ref 0.1–1)
BUN SERPL-MCNC: 14 MG/DL (ref 6–20)
CALCIUM SERPL-MCNC: 9.4 MG/DL (ref 8.7–10.5)
CHLORIDE SERPL-SCNC: 105 MMOL/L (ref 95–110)
CHOLEST SERPL-MCNC: 199 MG/DL (ref 120–199)
CHOLEST/HDLC SERPL: 3.5 {RATIO} (ref 2–5)
CO2 SERPL-SCNC: 27 MMOL/L (ref 23–29)
CREAT SERPL-MCNC: 0.7 MG/DL (ref 0.5–1.4)
EAG (OHS): 100 MG/DL (ref 68–131)
ERYTHROCYTE [DISTWIDTH] IN BLOOD BY AUTOMATED COUNT: 13.6 % (ref 11.5–14.5)
GFR SERPLBLD CREATININE-BSD FMLA CKD-EPI: >60 ML/MIN/1.73/M2
GLUCOSE SERPL-MCNC: 89 MG/DL (ref 70–110)
HBA1C MFR BLD: 5.1 % (ref 4–5.6)
HCT VFR BLD AUTO: 48 % (ref 40–54)
HCV AB SERPL QL IA: NORMAL
HDLC SERPL-MCNC: 57 MG/DL (ref 40–75)
HDLC SERPL: 28.6 % (ref 20–50)
HGB BLD-MCNC: 16.1 GM/DL (ref 14–18)
HIV 1+2 AB+HIV1 P24 AG SERPL QL IA: NORMAL
LDLC SERPL CALC-MCNC: 127.6 MG/DL (ref 63–159)
MCH RBC QN AUTO: 33.1 PG (ref 27–31)
MCHC RBC AUTO-ENTMCNC: 33.5 G/DL (ref 32–36)
MCV RBC AUTO: 99 FL (ref 82–98)
NONHDLC SERPL-MCNC: 142 MG/DL
PLATELET # BLD AUTO: 300 K/UL (ref 150–450)
PMV BLD AUTO: 10.4 FL (ref 9.2–12.9)
POTASSIUM SERPL-SCNC: 3.8 MMOL/L (ref 3.5–5.1)
PROT SERPL-MCNC: 6.9 GM/DL (ref 6–8.4)
PSA SERPL-MCNC: 0.42 NG/ML
RBC # BLD AUTO: 4.86 M/UL (ref 4.6–6.2)
SODIUM SERPL-SCNC: 141 MMOL/L (ref 136–145)
TRIGL SERPL-MCNC: 72 MG/DL (ref 30–150)
TSH SERPL-ACNC: 0.86 UIU/ML (ref 0.4–4)
WBC # BLD AUTO: 7.27 K/UL (ref 3.9–12.7)

## 2025-05-06 PROCEDURE — 85027 COMPLETE CBC AUTOMATED: CPT

## 2025-05-06 PROCEDURE — 36415 COLL VENOUS BLD VENIPUNCTURE: CPT | Mod: PO

## 2025-05-06 PROCEDURE — 3008F BODY MASS INDEX DOCD: CPT | Mod: CPTII,S$GLB,,

## 2025-05-06 PROCEDURE — 84443 ASSAY THYROID STIM HORMONE: CPT

## 2025-05-06 PROCEDURE — 99999 PR PBB SHADOW E&M-EST. PATIENT-LVL IV: CPT | Mod: PBBFAC,,,

## 2025-05-06 PROCEDURE — 80061 LIPID PANEL: CPT

## 2025-05-06 PROCEDURE — 86803 HEPATITIS C AB TEST: CPT

## 2025-05-06 PROCEDURE — 83036 HEMOGLOBIN GLYCOSYLATED A1C: CPT

## 2025-05-06 PROCEDURE — 1159F MED LIST DOCD IN RCRD: CPT | Mod: CPTII,S$GLB,,

## 2025-05-06 PROCEDURE — 3074F SYST BP LT 130 MM HG: CPT | Mod: CPTII,S$GLB,,

## 2025-05-06 PROCEDURE — 1160F RVW MEDS BY RX/DR IN RCRD: CPT | Mod: CPTII,S$GLB,,

## 2025-05-06 PROCEDURE — 87389 HIV-1 AG W/HIV-1&-2 AB AG IA: CPT

## 2025-05-06 PROCEDURE — 84153 ASSAY OF PSA TOTAL: CPT

## 2025-05-06 PROCEDURE — 99386 PREV VISIT NEW AGE 40-64: CPT | Mod: S$GLB,,,

## 2025-05-06 PROCEDURE — 80053 COMPREHEN METABOLIC PANEL: CPT

## 2025-05-06 PROCEDURE — 3079F DIAST BP 80-89 MM HG: CPT | Mod: CPTII,S$GLB,,

## 2025-05-06 RX ORDER — MONTELUKAST SODIUM 10 MG/1
10 TABLET ORAL NIGHTLY
Qty: 90 TABLET | Refills: 0 | Status: SHIPPED | OUTPATIENT
Start: 2025-05-06 | End: 2025-05-08

## 2025-05-06 RX ORDER — TADALAFIL 5 MG/1
5 TABLET ORAL DAILY PRN
Qty: 90 TABLET | Refills: 0 | Status: SHIPPED | OUTPATIENT
Start: 2025-05-06

## 2025-05-06 RX ORDER — MONTELUKAST SODIUM 10 MG/1
10 TABLET ORAL NIGHTLY
COMMUNITY
End: 2025-05-06 | Stop reason: SDUPTHER

## 2025-05-06 RX ORDER — TADALAFIL 5 MG/1
5 TABLET ORAL DAILY PRN
COMMUNITY
End: 2025-05-06 | Stop reason: SDUPTHER

## 2025-05-06 RX ORDER — ESOMEPRAZOLE MAGNESIUM 40 MG/1
40 CAPSULE, DELAYED RELEASE ORAL DAILY
Qty: 90 CAPSULE | Refills: 1 | Status: SHIPPED | OUTPATIENT
Start: 2025-05-06

## 2025-05-06 RX ORDER — FLUTICASONE PROPIONATE 50 MCG
SPRAY, SUSPENSION (ML) NASAL
Qty: 31.6 ML | Refills: 5 | Status: SHIPPED | OUTPATIENT
Start: 2025-05-06

## 2025-05-06 RX ORDER — AZELASTINE 1 MG/ML
SPRAY, METERED NASAL
Qty: 30 ML | Refills: 5 | Status: SHIPPED | OUTPATIENT
Start: 2025-05-06

## 2025-05-07 NOTE — TELEPHONE ENCOUNTER
Budesonide - Formoterol (120 Actuations), 160 MCG - 4.5 MCG Inhaler    Generic for Symbicort (120 Actuations)    Not Singluair.

## 2025-05-08 ENCOUNTER — RESULTS FOLLOW-UP (OUTPATIENT)
Dept: INTERNAL MEDICINE | Facility: CLINIC | Age: 50
End: 2025-05-08

## 2025-05-08 RX ORDER — BUDESONIDE AND FORMOTEROL FUMARATE DIHYDRATE 160; 4.5 UG/1; UG/1
2 AEROSOL RESPIRATORY (INHALATION) 2 TIMES DAILY PRN
Qty: 10.2 G | Refills: 1 | Status: SHIPPED | OUTPATIENT
Start: 2025-05-08 | End: 2026-05-08

## 2025-05-14 ENCOUNTER — TELEPHONE (OUTPATIENT)
Dept: INTERNAL MEDICINE | Facility: CLINIC | Age: 50
End: 2025-05-14
Payer: COMMERCIAL

## 2025-05-14 NOTE — TELEPHONE ENCOUNTER
I spoke to the pharmacist, Shantal, regarding clarification on Flonase prescription.  Prescription clarification is; Flonase, 1 spray to each nostril twice a day.

## 2025-05-14 NOTE — TELEPHONE ENCOUNTER
----- Message from Lily sent at 5/14/2025 12:41 PM CDT -----  Contact: [place] [phone] [facility]  Pharmacy is calling to clarify an RX.RX name:  fluticasone propionate (FLONASE) 50 mcg/actuation nasal sprayWhat do they need to clarify:  the dosage need to be clarifyComments: [facility] - [place], [place] - [id number] [address]Tgvhv: [phone] Fax: [phone]

## 2025-07-24 DIAGNOSIS — N52.9 ERECTILE DYSFUNCTION, UNSPECIFIED ERECTILE DYSFUNCTION TYPE: ICD-10-CM

## 2025-07-24 NOTE — TELEPHONE ENCOUNTER
No care due was identified.  [facility] Embedded Care Due Messages. Reference number: [id number].   7/24/2025 6:46:29 PM CDT

## 2025-07-25 RX ORDER — TADALAFIL 5 MG/1
5 TABLET ORAL DAILY PRN
Qty: 30 TABLET | Refills: 2 | Status: SHIPPED | OUTPATIENT
Start: 2025-07-25

## 2025-07-25 NOTE — TELEPHONE ENCOUNTER
LOV:05/06/2025      UOV: no upcoming apppointments    Check rx comments  Hey Dr Lerner...According to [employer], I am out of refills for my tadalafil.  Can you please refill the RX but with monthly supplies rather than 90 days.  [employer] only sends monthly supply for this medication even if 90 days is requested.  Thank you very much

## (undated) DEVICE — NDL INSUF ULTRA VERESS 120MM

## (undated) DEVICE — BLANKET UPPER BODY 78.7X29.9IN

## (undated) DEVICE — Device

## (undated) DEVICE — SEE L#120831

## (undated) DEVICE — SEE MEDLINE ITEM 146292

## (undated) DEVICE — SUT ETHILON 3/0 18IN PS-1

## (undated) DEVICE — KIT ON-Q PAIN FIX 6.5MM 100ML

## (undated) DEVICE — IRRIGATOR ENDOSCOPY DISP.

## (undated) DEVICE — APPLIER CLIP ENDO MED/LG 10MM

## (undated) DEVICE — BLADE SURG CARBON STEEL SZ11

## (undated) DEVICE — COVER OVERHEAD SURG LT BLUE

## (undated) DEVICE — SUT 2/0 36IN COATED VICRYL

## (undated) DEVICE — DISSECTOR CURVED 5DCD

## (undated) DEVICE — GLOVE BIOGEL ECLIPSE SZ 7.5

## (undated) DEVICE — SUT CTD VICRYL 3-0 CR/SH

## (undated) DEVICE — SUT 2/0 18IN COATED VICRYL

## (undated) DEVICE — DRAIN PENROSE XRAY 18 X 1/4 ST

## (undated) DEVICE — CHLORAPREP W TINT 26ML APPL

## (undated) DEVICE — CLIPPER BLADE MOD 4406 (CAREF)

## (undated) DEVICE — SYR 10CC LUER LOCK

## (undated) DEVICE — CLOSURE SKIN STERI STRIP 1/2X4

## (undated) DEVICE — SUT CTD VICRYL BR CR/SH VIL

## (undated) DEVICE — ELECTRODE REM PLYHSV RETURN 9

## (undated) DEVICE — TROCAR ENDOPATH XCEL 5X100MM

## (undated) DEVICE — SCISSOR CURVED ENDOPATH 5MM

## (undated) DEVICE — SPONGE GAUZE 16PLY 4X4

## (undated) DEVICE — FOAM APP FILM BARRIER NO STING

## (undated) DEVICE — NDL SAFETY 22G X 1.5 ECLIPSE

## (undated) DEVICE — SOL NS 1000CC

## (undated) DEVICE — SEE MEDLINE ITEM 152622

## (undated) DEVICE — PACK ENDOSCOPY GENERAL

## (undated) DEVICE — CANISTER SUCTION 2 LTR

## (undated) DEVICE — TUBING INSUFFLATION 10

## (undated) DEVICE — SPONGE DERMACEA 4X4IN 12PLY

## (undated) DEVICE — STAPLER SKIN PROXIMATE WIDE

## (undated) DEVICE — KIT ANTIFOG

## (undated) DEVICE — DRESSING ADH ISLAND 2.5 X 3

## (undated) DEVICE — SEE MEDLINE ITEM 157117

## (undated) DEVICE — TROCAR ENDOPATH XCEL 11MM 10CM